# Patient Record
Sex: MALE | Race: WHITE | Employment: FULL TIME | URBAN - METROPOLITAN AREA
[De-identification: names, ages, dates, MRNs, and addresses within clinical notes are randomized per-mention and may not be internally consistent; named-entity substitution may affect disease eponyms.]

---

## 2019-01-02 ENCOUNTER — OFFICE VISIT (OUTPATIENT)
Dept: PULMONOLOGY | Age: 44
End: 2019-01-02
Payer: COMMERCIAL

## 2019-01-02 VITALS
OXYGEN SATURATION: 96 % | DIASTOLIC BLOOD PRESSURE: 80 MMHG | BODY MASS INDEX: 45.1 KG/M2 | HEART RATE: 64 BPM | SYSTOLIC BLOOD PRESSURE: 120 MMHG | HEIGHT: 70 IN | WEIGHT: 315 LBS

## 2019-01-02 DIAGNOSIS — G47.33 OSA (OBSTRUCTIVE SLEEP APNEA): ICD-10-CM

## 2019-01-02 DIAGNOSIS — R06.02 SOB (SHORTNESS OF BREATH) ON EXERTION: ICD-10-CM

## 2019-01-02 DIAGNOSIS — E66.01 MORBID OBESITY (HCC): ICD-10-CM

## 2019-01-02 DIAGNOSIS — G47.10 EXCESSIVE SLEEPINESS: ICD-10-CM

## 2019-01-02 PROCEDURE — 99204 OFFICE O/P NEW MOD 45 MIN: CPT | Performed by: INTERNAL MEDICINE

## 2019-01-02 RX ORDER — DEXLANSOPRAZOLE 60 MG/1
60 CAPSULE, DELAYED RELEASE ORAL DAILY
COMMUNITY
End: 2021-06-03

## 2019-01-02 RX ORDER — METOPROLOL TARTRATE 50 MG/1
50 TABLET, FILM COATED ORAL 2 TIMES DAILY
COMMUNITY

## 2019-01-02 ASSESSMENT — ENCOUNTER SYMPTOMS
EYE DISCHARGE: 0
BACK PAIN: 0
ABDOMINAL DISTENTION: 0
ABDOMINAL PAIN: 0
SHORTNESS OF BREATH: 0
EYE ITCHING: 0
COUGH: 0

## 2019-01-02 ASSESSMENT — SLEEP AND FATIGUE QUESTIONNAIRES
HOW LIKELY ARE YOU TO NOD OFF OR FALL ASLEEP WHILE SITTING AND READING: 2
HOW LIKELY ARE YOU TO NOD OFF OR FALL ASLEEP IN A CAR, WHILE STOPPED FOR A FEW MINUTES IN TRAFFIC: 0
ESS TOTAL SCORE: 7
HOW LIKELY ARE YOU TO NOD OFF OR FALL ASLEEP WHILE LYING DOWN TO REST IN THE AFTERNOON WHEN CIRCUMSTANCES PERMIT: 1
HOW LIKELY ARE YOU TO NOD OFF OR FALL ASLEEP WHILE SITTING AND TALKING TO SOMEONE: 0
HOW LIKELY ARE YOU TO NOD OFF OR FALL ASLEEP WHEN YOU ARE A PASSENGER IN A CAR FOR AN HOUR WITHOUT A BREAK: 1
HOW LIKELY ARE YOU TO NOD OFF OR FALL ASLEEP WHILE SITTING QUIETLY AFTER LUNCH WITHOUT ALCOHOL: 0
NECK CIRCUMFERENCE (INCHES): 19
HOW LIKELY ARE YOU TO NOD OFF OR FALL ASLEEP WHILE WATCHING TV: 2
HOW LIKELY ARE YOU TO NOD OFF OR FALL ASLEEP WHILE SITTING INACTIVE IN A PUBLIC PLACE: 1

## 2019-01-29 ENCOUNTER — HOSPITAL ENCOUNTER (OUTPATIENT)
Dept: SLEEP CENTER | Age: 44
Discharge: HOME | End: 2019-01-29
Payer: COMMERCIAL

## 2019-01-29 DIAGNOSIS — G47.33 OSA (OBSTRUCTIVE SLEEP APNEA): ICD-10-CM

## 2019-01-29 PROCEDURE — 95811 POLYSOM 6/>YRS CPAP 4/> PARM: CPT

## 2019-01-29 ASSESSMENT — SLEEP AND FATIGUE QUESTIONNAIRES
HOW LIKELY ARE YOU TO NOD OFF OR FALL ASLEEP WHEN YOU ARE A PASSENGER IN A CAR FOR AN HOUR WITHOUT A BREAK: 0
HOW LIKELY ARE YOU TO NOD OFF OR FALL ASLEEP WHILE SITTING QUIETLY AFTER LUNCH WITHOUT ALCOHOL: 0
HOW LIKELY ARE YOU TO NOD OFF OR FALL ASLEEP WHILE LYING DOWN TO REST IN THE AFTERNOON WHEN CIRCUMSTANCES PERMIT: 1
HOW LIKELY ARE YOU TO NOD OFF OR FALL ASLEEP IN A CAR, WHILE STOPPED FOR A FEW MINUTES IN TRAFFIC: 0
ESS TOTAL SCORE: 3
HOW LIKELY ARE YOU TO NOD OFF OR FALL ASLEEP WHILE SITTING INACTIVE IN A PUBLIC PLACE: 0
HOW LIKELY ARE YOU TO NOD OFF OR FALL ASLEEP WHILE SITTING AND READING: 1
HOW LIKELY ARE YOU TO NOD OFF OR FALL ASLEEP WHILE WATCHING TV: 1
HOW LIKELY ARE YOU TO NOD OFF OR FALL ASLEEP WHILE SITTING AND TALKING TO SOMEONE: 0

## 2019-01-30 PROCEDURE — 95811 POLYSOM 6/>YRS CPAP 4/> PARM: CPT | Performed by: INTERNAL MEDICINE

## 2019-02-11 ENCOUNTER — HOSPITAL ENCOUNTER (OUTPATIENT)
Dept: PULMONOLOGY | Age: 44
Discharge: HOME | End: 2019-02-11
Payer: COMMERCIAL

## 2019-02-11 DIAGNOSIS — R06.02 SOB (SHORTNESS OF BREATH) ON EXERTION: ICD-10-CM

## 2019-02-11 LAB
DLCO %PRED: 111 %
DLCO PRED: NORMAL ML/MIN/MMHG
DLCO/VA %PRED: NORMAL %
DLCO/VA PRED: NORMAL ML/MIN/MMHG
DLCO/VA: NORMAL ML/MIN/MMHG
DLCO: NORMAL ML/MIN/MMHG
EXPIRATORY TIME: NORMAL SEC
FEF 25-75% %PRED-PRE: NORMAL L/SEC
FEF 25-75% PRED: NORMAL L/SEC
FEF 25-75%-PRE: NORMAL L/SEC
FEV1 %PRED-PRE: 72 %
FEV1 PRED: NORMAL L
FEV1/FVC %PRED-PRE: NORMAL %
FEV1/FVC PRED: NORMAL %
FEV1/FVC: 80 %
FEV1: NORMAL L
FVC %PRED-PRE: NORMAL %
FVC PRED: NORMAL L
FVC: NORMAL L
GAW %PRED: NORMAL %
GAW PRED: NORMAL L/S/CMH2O
GAW: NORMAL L/S/CMH2O
IC %PRED: NORMAL %
IC PRED: NORMAL L
IC: NORMAL L
MVV %PRED-PRE: NORMAL %
MVV PRED: NORMAL L/MIN
MVV-PRE: NORMAL L/MIN
PEF %PRED-PRE: NORMAL L/SEC
PEF PRED: NORMAL L/SEC
PEF-PRE: NORMAL L/SEC
RAW %PRED: NORMAL %
RAW PRED: NORMAL CMH2O/L/S
RAW: NORMAL CMH2O/L/S
RV %PRED: NORMAL %
RV PRED: NORMAL L
RV: NORMAL L
SVC %PRED: NORMAL %
SVC PRED: NORMAL L
SVC: NORMAL L
TLC %PRED: 80 %
TLC PRED: NORMAL L
TLC: NORMAL L
VA %PRED: NORMAL %
VA PRED: NORMAL L
VA: NORMAL L
VTG %PRED: NORMAL %
VTG PRED: NORMAL L
VTG: NORMAL L

## 2019-02-11 PROCEDURE — 94726 PLETHYSMOGRAPHY LUNG VOLUMES: CPT

## 2019-02-11 PROCEDURE — 94729 DIFFUSING CAPACITY: CPT

## 2019-02-11 PROCEDURE — 94060 EVALUATION OF WHEEZING: CPT

## 2019-02-11 ASSESSMENT — PULMONARY FUNCTION TESTS
FEV1/FVC: 80
FEV1_PERCENT_PREDICTED_PRE: 72

## 2019-02-22 ENCOUNTER — OFFICE VISIT (OUTPATIENT)
Dept: PULMONOLOGY | Age: 44
End: 2019-02-22
Payer: COMMERCIAL

## 2019-02-22 VITALS
WEIGHT: 315 LBS | DIASTOLIC BLOOD PRESSURE: 78 MMHG | BODY MASS INDEX: 45.1 KG/M2 | OXYGEN SATURATION: 97 % | HEART RATE: 62 BPM | HEIGHT: 70 IN | SYSTOLIC BLOOD PRESSURE: 120 MMHG

## 2019-02-22 DIAGNOSIS — E66.01 MORBID OBESITY (HCC): ICD-10-CM

## 2019-02-22 DIAGNOSIS — G47.10 EXCESSIVE SLEEPINESS: ICD-10-CM

## 2019-02-22 DIAGNOSIS — G47.33 OSA (OBSTRUCTIVE SLEEP APNEA): ICD-10-CM

## 2019-02-22 DIAGNOSIS — R06.02 SOB (SHORTNESS OF BREATH) ON EXERTION: ICD-10-CM

## 2019-02-22 PROCEDURE — 99214 OFFICE O/P EST MOD 30 MIN: CPT | Performed by: INTERNAL MEDICINE

## 2019-02-22 ASSESSMENT — ENCOUNTER SYMPTOMS
ABDOMINAL DISTENTION: 0
BACK PAIN: 0
EYE DISCHARGE: 0
SHORTNESS OF BREATH: 0
ABDOMINAL PAIN: 0
COUGH: 0
EYE ITCHING: 0

## 2019-03-02 ENCOUNTER — HOSPITAL ENCOUNTER (EMERGENCY)
Age: 44
Discharge: HOME | End: 2019-03-03
Attending: EMERGENCY MEDICINE
Payer: COMMERCIAL

## 2019-03-02 DIAGNOSIS — N20.0 NEPHROLITHIASIS: ICD-10-CM

## 2019-03-02 DIAGNOSIS — N20.0 KIDNEY STONE: Primary | ICD-10-CM

## 2019-03-02 PROCEDURE — 83690 ASSAY OF LIPASE: CPT

## 2019-03-02 PROCEDURE — 80053 COMPREHEN METABOLIC PANEL: CPT

## 2019-03-02 PROCEDURE — 81001 URINALYSIS AUTO W/SCOPE: CPT

## 2019-03-02 PROCEDURE — 99284 EMERGENCY DEPT VISIT MOD MDM: CPT

## 2019-03-02 PROCEDURE — 85025 COMPLETE CBC W/AUTO DIFF WBC: CPT

## 2019-03-02 ASSESSMENT — PAIN DESCRIPTION - DESCRIPTORS: DESCRIPTORS: ACHING;SHARP

## 2019-03-02 ASSESSMENT — PAIN DESCRIPTION - ONSET: ONSET: ON-GOING

## 2019-03-02 ASSESSMENT — PAIN DESCRIPTION - LOCATION: LOCATION: FLANK

## 2019-03-02 ASSESSMENT — PAIN DESCRIPTION - ORIENTATION: ORIENTATION: RIGHT

## 2019-03-03 ENCOUNTER — APPOINTMENT (OUTPATIENT)
Dept: CT IMAGING | Age: 44
End: 2019-03-03
Payer: COMMERCIAL

## 2019-03-03 VITALS
HEIGHT: 70 IN | WEIGHT: 315 LBS | BODY MASS INDEX: 45.1 KG/M2 | TEMPERATURE: 97.4 F | OXYGEN SATURATION: 98 % | DIASTOLIC BLOOD PRESSURE: 73 MMHG | HEART RATE: 61 BPM | SYSTOLIC BLOOD PRESSURE: 114 MMHG | RESPIRATION RATE: 18 BRPM

## 2019-03-03 LAB
ALBUMIN SERPL-MCNC: 4.3 GM/DL (ref 3.4–5)
ALP BLD-CCNC: 65 IU/L (ref 40–128)
ALT SERPL-CCNC: 53 U/L (ref 10–40)
ANION GAP SERPL CALCULATED.3IONS-SCNC: 11 MMOL/L (ref 4–16)
AST SERPL-CCNC: 33 IU/L (ref 15–37)
BACTERIA: NEGATIVE /HPF
BASOPHILS ABSOLUTE: 0 K/CU MM
BASOPHILS RELATIVE PERCENT: 0.3 % (ref 0–1)
BILIRUB SERPL-MCNC: 0.3 MG/DL (ref 0–1)
BILIRUBIN URINE: NEGATIVE MG/DL
BLOOD, URINE: ABNORMAL
BUN BLDV-MCNC: 15 MG/DL (ref 6–23)
CALCIUM SERPL-MCNC: 9.4 MG/DL (ref 8.3–10.6)
CHLORIDE BLD-SCNC: 100 MMOL/L (ref 99–110)
CLARITY: CLEAR
CO2: 24 MMOL/L (ref 21–32)
COLOR: YELLOW
CREAT SERPL-MCNC: 1.8 MG/DL (ref 0.9–1.3)
DIFFERENTIAL TYPE: ABNORMAL
EOSINOPHILS ABSOLUTE: 0 K/CU MM
EOSINOPHILS RELATIVE PERCENT: 0 % (ref 0–3)
GFR AFRICAN AMERICAN: 50 ML/MIN/1.73M2
GFR NON-AFRICAN AMERICAN: 42 ML/MIN/1.73M2
GLUCOSE BLD-MCNC: 121 MG/DL (ref 70–99)
GLUCOSE, URINE: NEGATIVE MG/DL
HCT VFR BLD CALC: 44.7 % (ref 42–52)
HEMOGLOBIN: 14.5 GM/DL (ref 13.5–18)
HYALINE CASTS: 0 /LPF
IMMATURE NEUTROPHIL %: 0.8 % (ref 0–0.43)
KETONES, URINE: NEGATIVE MG/DL
LEUKOCYTE ESTERASE, URINE: NEGATIVE
LIPASE: 18 IU/L (ref 13–60)
LYMPHOCYTES ABSOLUTE: 1 K/CU MM
LYMPHOCYTES RELATIVE PERCENT: 7.3 % (ref 24–44)
MCH RBC QN AUTO: 30.3 PG (ref 27–31)
MCHC RBC AUTO-ENTMCNC: 32.4 % (ref 32–36)
MCV RBC AUTO: 93.5 FL (ref 78–100)
MONOCYTES ABSOLUTE: 0.6 K/CU MM
MONOCYTES RELATIVE PERCENT: 4.2 % (ref 0–4)
MUCUS: ABNORMAL HPF
NITRITE URINE, QUANTITATIVE: NEGATIVE
NUCLEATED RBC %: 0 %
PDW BLD-RTO: 12.3 % (ref 11.7–14.9)
PH, URINE: 5 (ref 5–8)
PLATELET # BLD: 215 K/CU MM (ref 140–440)
PMV BLD AUTO: 10.6 FL (ref 7.5–11.1)
POTASSIUM SERPL-SCNC: 4.4 MMOL/L (ref 3.5–5.1)
PROTEIN UA: NEGATIVE MG/DL
RBC # BLD: 4.78 M/CU MM (ref 4.6–6.2)
RBC URINE: 40 /HPF (ref 0–3)
SEGMENTED NEUTROPHILS ABSOLUTE COUNT: 11.6 K/CU MM
SEGMENTED NEUTROPHILS RELATIVE PERCENT: 87.4 % (ref 36–66)
SODIUM BLD-SCNC: 135 MMOL/L (ref 135–145)
SPECIFIC GRAVITY UA: 1.02 (ref 1–1.03)
SQUAMOUS EPITHELIAL: <1 /HPF
TOTAL IMMATURE NEUTOROPHIL: 0.11 K/CU MM
TOTAL NUCLEATED RBC: 0 K/CU MM
TOTAL PROTEIN: 7.1 GM/DL (ref 6.4–8.2)
TRICHOMONAS: ABNORMAL /HPF
UROBILINOGEN, URINE: NORMAL MG/DL (ref 0.2–1)
WBC # BLD: 13.3 K/CU MM (ref 4–10.5)
WBC UA: 1 /HPF (ref 0–2)

## 2019-03-03 PROCEDURE — 6360000002 HC RX W HCPCS: Performed by: EMERGENCY MEDICINE

## 2019-03-03 PROCEDURE — 2580000003 HC RX 258: Performed by: EMERGENCY MEDICINE

## 2019-03-03 PROCEDURE — 6370000000 HC RX 637 (ALT 250 FOR IP): Performed by: EMERGENCY MEDICINE

## 2019-03-03 PROCEDURE — 74176 CT ABD & PELVIS W/O CONTRAST: CPT

## 2019-03-03 PROCEDURE — 96374 THER/PROPH/DIAG INJ IV PUSH: CPT

## 2019-03-03 PROCEDURE — 87040 BLOOD CULTURE FOR BACTERIA: CPT

## 2019-03-03 PROCEDURE — 96375 TX/PRO/DX INJ NEW DRUG ADDON: CPT

## 2019-03-03 RX ORDER — 0.9 % SODIUM CHLORIDE 0.9 %
1000 INTRAVENOUS SOLUTION INTRAVENOUS ONCE
Status: COMPLETED | OUTPATIENT
Start: 2019-03-03 | End: 2019-03-03

## 2019-03-03 RX ORDER — OXYCODONE HYDROCHLORIDE AND ACETAMINOPHEN 5; 325 MG/1; MG/1
1 TABLET ORAL ONCE
Status: COMPLETED | OUTPATIENT
Start: 2019-03-03 | End: 2019-03-03

## 2019-03-03 RX ORDER — TAMSULOSIN HYDROCHLORIDE 0.4 MG/1
0.4 CAPSULE ORAL DAILY
Qty: 5 CAPSULE | Refills: 0 | Status: SHIPPED | OUTPATIENT
Start: 2019-03-03 | End: 2021-05-12

## 2019-03-03 RX ORDER — ONDANSETRON 2 MG/ML
4 INJECTION INTRAMUSCULAR; INTRAVENOUS ONCE
Status: COMPLETED | OUTPATIENT
Start: 2019-03-03 | End: 2019-03-03

## 2019-03-03 RX ORDER — MORPHINE SULFATE 4 MG/ML
4 INJECTION, SOLUTION INTRAMUSCULAR; INTRAVENOUS ONCE
Status: COMPLETED | OUTPATIENT
Start: 2019-03-03 | End: 2019-03-03

## 2019-03-03 RX ORDER — KETOROLAC TROMETHAMINE 30 MG/ML
15 INJECTION, SOLUTION INTRAMUSCULAR; INTRAVENOUS ONCE
Status: COMPLETED | OUTPATIENT
Start: 2019-03-03 | End: 2019-03-03

## 2019-03-03 RX ORDER — OXYCODONE HYDROCHLORIDE AND ACETAMINOPHEN 5; 325 MG/1; MG/1
1 TABLET ORAL EVERY 8 HOURS PRN
Qty: 15 TABLET | Refills: 0 | Status: SHIPPED | OUTPATIENT
Start: 2019-03-03 | End: 2019-03-10

## 2019-03-03 RX ORDER — ONDANSETRON 4 MG/1
4 TABLET, FILM COATED ORAL EVERY 8 HOURS PRN
Qty: 30 TABLET | Refills: 0 | Status: SHIPPED | OUTPATIENT
Start: 2019-03-03 | End: 2021-05-12

## 2019-03-03 RX ADMIN — ONDANSETRON 4 MG: 2 INJECTION INTRAMUSCULAR; INTRAVENOUS at 00:27

## 2019-03-03 RX ADMIN — KETOROLAC TROMETHAMINE 15 MG: 30 INJECTION, SOLUTION INTRAMUSCULAR at 00:26

## 2019-03-03 RX ADMIN — SODIUM CHLORIDE 1000 ML: 9 INJECTION, SOLUTION INTRAVENOUS at 01:20

## 2019-03-03 RX ADMIN — MORPHINE SULFATE 4 MG: 4 INJECTION INTRAVENOUS at 00:27

## 2019-03-03 RX ADMIN — OXYCODONE HYDROCHLORIDE AND ACETAMINOPHEN 1 TABLET: 5; 325 TABLET ORAL at 04:05

## 2019-03-03 ASSESSMENT — PAIN SCALES - GENERAL
PAINLEVEL_OUTOF10: 6
PAINLEVEL_OUTOF10: 1
PAINLEVEL_OUTOF10: 3
PAINLEVEL_OUTOF10: 6

## 2019-03-03 ASSESSMENT — PAIN DESCRIPTION - PAIN TYPE: TYPE: ACUTE PAIN

## 2019-03-03 ASSESSMENT — PAIN DESCRIPTION - ORIENTATION: ORIENTATION: RIGHT

## 2019-03-03 ASSESSMENT — PAIN DESCRIPTION - DESCRIPTORS: DESCRIPTORS: SHARP

## 2019-03-03 ASSESSMENT — PAIN DESCRIPTION - LOCATION: LOCATION: FLANK

## 2019-03-04 ENCOUNTER — HOSPITAL ENCOUNTER (OUTPATIENT)
Age: 44
Setting detail: OUTPATIENT SURGERY
Discharge: HOME | End: 2019-03-04
Attending: UROLOGY | Admitting: UROLOGY
Payer: COMMERCIAL

## 2019-03-04 ENCOUNTER — ANESTHESIA EVENT (OUTPATIENT)
Dept: OPERATING ROOM | Age: 44
End: 2019-03-04
Payer: COMMERCIAL

## 2019-03-04 ENCOUNTER — APPOINTMENT (OUTPATIENT)
Dept: GENERAL RADIOLOGY | Age: 44
End: 2019-03-04
Attending: UROLOGY
Payer: COMMERCIAL

## 2019-03-04 ENCOUNTER — ANESTHESIA (OUTPATIENT)
Dept: OPERATING ROOM | Age: 44
End: 2019-03-04
Payer: COMMERCIAL

## 2019-03-04 VITALS
TEMPERATURE: 97.7 F | OXYGEN SATURATION: 99 % | SYSTOLIC BLOOD PRESSURE: 114 MMHG | DIASTOLIC BLOOD PRESSURE: 59 MMHG | RESPIRATION RATE: 3 BRPM

## 2019-03-04 VITALS
DIASTOLIC BLOOD PRESSURE: 60 MMHG | HEART RATE: 67 BPM | HEIGHT: 70 IN | RESPIRATION RATE: 16 BRPM | WEIGHT: 315 LBS | OXYGEN SATURATION: 95 % | BODY MASS INDEX: 45.1 KG/M2 | TEMPERATURE: 97 F | SYSTOLIC BLOOD PRESSURE: 134 MMHG

## 2019-03-04 DIAGNOSIS — N20.1 URETERAL STONE: Primary | ICD-10-CM

## 2019-03-04 PROCEDURE — 7100000000 HC PACU RECOVERY - FIRST 15 MIN: Performed by: UROLOGY

## 2019-03-04 PROCEDURE — 2580000003 HC RX 258

## 2019-03-04 PROCEDURE — 7100000011 HC PHASE II RECOVERY - ADDTL 15 MIN: Performed by: UROLOGY

## 2019-03-04 PROCEDURE — C1769 GUIDE WIRE: HCPCS | Performed by: UROLOGY

## 2019-03-04 PROCEDURE — 3600000003 HC SURGERY LEVEL 3 BASE: Performed by: UROLOGY

## 2019-03-04 PROCEDURE — 2709999900 HC NON-CHARGEABLE SUPPLY: Performed by: UROLOGY

## 2019-03-04 PROCEDURE — 7100000010 HC PHASE II RECOVERY - FIRST 15 MIN: Performed by: UROLOGY

## 2019-03-04 PROCEDURE — C1758 CATHETER, URETERAL: HCPCS | Performed by: UROLOGY

## 2019-03-04 PROCEDURE — 6360000002 HC RX W HCPCS: Performed by: UROLOGY

## 2019-03-04 PROCEDURE — 6360000002 HC RX W HCPCS: Performed by: NURSE ANESTHETIST, CERTIFIED REGISTERED

## 2019-03-04 PROCEDURE — 7100000001 HC PACU RECOVERY - ADDTL 15 MIN: Performed by: UROLOGY

## 2019-03-04 PROCEDURE — 76000 FLUOROSCOPY <1 HR PHYS/QHP: CPT

## 2019-03-04 PROCEDURE — 6360000004 HC RX CONTRAST MEDICATION: Performed by: UROLOGY

## 2019-03-04 PROCEDURE — C2617 STENT, NON-COR, TEM W/O DEL: HCPCS | Performed by: UROLOGY

## 2019-03-04 PROCEDURE — 3700000001 HC ADD 15 MINUTES (ANESTHESIA): Performed by: UROLOGY

## 2019-03-04 PROCEDURE — 3600000013 HC SURGERY LEVEL 3 ADDTL 15MIN: Performed by: UROLOGY

## 2019-03-04 PROCEDURE — C1773 RET DEV, INSERTABLE: HCPCS | Performed by: UROLOGY

## 2019-03-04 PROCEDURE — 3700000000 HC ANESTHESIA ATTENDED CARE: Performed by: UROLOGY

## 2019-03-04 PROCEDURE — C1894 INTRO/SHEATH, NON-LASER: HCPCS | Performed by: UROLOGY

## 2019-03-04 DEVICE — VARIABLE LENGTH URETERAL STENT
Type: IMPLANTABLE DEVICE | Site: URETER | Status: FUNCTIONAL
Brand: CONTOUR VL™

## 2019-03-04 RX ORDER — DEXAMETHASONE SODIUM PHOSPHATE 4 MG/ML
INJECTION, SOLUTION INTRA-ARTICULAR; INTRALESIONAL; INTRAMUSCULAR; INTRAVENOUS; SOFT TISSUE PRN
Status: DISCONTINUED | OUTPATIENT
Start: 2019-03-04 | End: 2019-03-04 | Stop reason: SDUPTHER

## 2019-03-04 RX ORDER — FENTANYL CITRATE 50 UG/ML
50 INJECTION, SOLUTION INTRAMUSCULAR; INTRAVENOUS EVERY 5 MIN PRN
Status: CANCELLED | OUTPATIENT
Start: 2019-03-04

## 2019-03-04 RX ORDER — CEFAZOLIN SODIUM 2 G/100ML
2 INJECTION, SOLUTION INTRAVENOUS
Status: DISCONTINUED | OUTPATIENT
Start: 2019-03-04 | End: 2019-03-04 | Stop reason: ALTCHOICE

## 2019-03-04 RX ORDER — DIPHENHYDRAMINE HYDROCHLORIDE 50 MG/ML
12.5 INJECTION INTRAMUSCULAR; INTRAVENOUS
Status: CANCELLED | OUTPATIENT
Start: 2019-03-04 | End: 2019-03-04

## 2019-03-04 RX ORDER — SODIUM CHLORIDE, SODIUM LACTATE, POTASSIUM CHLORIDE, CALCIUM CHLORIDE 600; 310; 30; 20 MG/100ML; MG/100ML; MG/100ML; MG/100ML
INJECTION, SOLUTION INTRAVENOUS
Status: COMPLETED
Start: 2019-03-04 | End: 2019-03-04

## 2019-03-04 RX ORDER — PROPOFOL 10 MG/ML
INJECTION, EMULSION INTRAVENOUS PRN
Status: DISCONTINUED | OUTPATIENT
Start: 2019-03-04 | End: 2019-03-04 | Stop reason: SDUPTHER

## 2019-03-04 RX ORDER — HYDRALAZINE HYDROCHLORIDE 20 MG/ML
5 INJECTION INTRAMUSCULAR; INTRAVENOUS EVERY 10 MIN PRN
Status: CANCELLED | OUTPATIENT
Start: 2019-03-04

## 2019-03-04 RX ORDER — HYDROMORPHONE HCL 110MG/55ML
0.5 PATIENT CONTROLLED ANALGESIA SYRINGE INTRAVENOUS EVERY 5 MIN PRN
Status: CANCELLED | OUTPATIENT
Start: 2019-03-04

## 2019-03-04 RX ORDER — FENTANYL CITRATE 50 UG/ML
INJECTION, SOLUTION INTRAMUSCULAR; INTRAVENOUS PRN
Status: DISCONTINUED | OUTPATIENT
Start: 2019-03-04 | End: 2019-03-04 | Stop reason: SDUPTHER

## 2019-03-04 RX ORDER — PROMETHAZINE HYDROCHLORIDE 25 MG/ML
6.25 INJECTION, SOLUTION INTRAMUSCULAR; INTRAVENOUS
Status: CANCELLED | OUTPATIENT
Start: 2019-03-04 | End: 2019-03-04

## 2019-03-04 RX ORDER — MEPERIDINE HYDROCHLORIDE 25 MG/ML
12.5 INJECTION INTRAMUSCULAR; INTRAVENOUS; SUBCUTANEOUS EVERY 5 MIN PRN
Status: CANCELLED | OUTPATIENT
Start: 2019-03-04

## 2019-03-04 RX ORDER — LABETALOL HYDROCHLORIDE 5 MG/ML
5 INJECTION, SOLUTION INTRAVENOUS EVERY 10 MIN PRN
Status: CANCELLED | OUTPATIENT
Start: 2019-03-04

## 2019-03-04 RX ORDER — MIDAZOLAM HYDROCHLORIDE 1 MG/ML
INJECTION INTRAMUSCULAR; INTRAVENOUS PRN
Status: DISCONTINUED | OUTPATIENT
Start: 2019-03-04 | End: 2019-03-04 | Stop reason: SDUPTHER

## 2019-03-04 RX ORDER — CIPROFLOXACIN 2 MG/ML
400 INJECTION, SOLUTION INTRAVENOUS
Status: COMPLETED | OUTPATIENT
Start: 2019-03-04 | End: 2019-03-04

## 2019-03-04 RX ORDER — SODIUM CHLORIDE, SODIUM LACTATE, POTASSIUM CHLORIDE, CALCIUM CHLORIDE 600; 310; 30; 20 MG/100ML; MG/100ML; MG/100ML; MG/100ML
INJECTION, SOLUTION INTRAVENOUS CONTINUOUS
Status: DISCONTINUED | OUTPATIENT
Start: 2019-03-04 | End: 2019-03-04 | Stop reason: HOSPADM

## 2019-03-04 RX ORDER — KETOROLAC TROMETHAMINE 30 MG/ML
INJECTION, SOLUTION INTRAMUSCULAR; INTRAVENOUS PRN
Status: DISCONTINUED | OUTPATIENT
Start: 2019-03-04 | End: 2019-03-04 | Stop reason: SDUPTHER

## 2019-03-04 RX ORDER — ONDANSETRON 2 MG/ML
INJECTION INTRAMUSCULAR; INTRAVENOUS PRN
Status: DISCONTINUED | OUTPATIENT
Start: 2019-03-04 | End: 2019-03-04 | Stop reason: SDUPTHER

## 2019-03-04 RX ORDER — OXYCODONE HYDROCHLORIDE AND ACETAMINOPHEN 5; 325 MG/1; MG/1
1 TABLET ORAL EVERY 6 HOURS PRN
Qty: 12 TABLET | Refills: 0 | Status: SHIPPED | OUTPATIENT
Start: 2019-03-04 | End: 2019-03-07

## 2019-03-04 RX ORDER — CEPHALEXIN 500 MG/1
500 CAPSULE ORAL 3 TIMES DAILY
Qty: 15 CAPSULE | Refills: 0 | Status: SHIPPED | OUTPATIENT
Start: 2019-03-04 | End: 2021-05-12 | Stop reason: ALTCHOICE

## 2019-03-04 RX ADMIN — DEXAMETHASONE SODIUM PHOSPHATE 8 MG: 4 INJECTION, SOLUTION INTRAMUSCULAR; INTRAVENOUS at 14:41

## 2019-03-04 RX ADMIN — MIDAZOLAM HYDROCHLORIDE 2 MG: 1 INJECTION, SOLUTION INTRAMUSCULAR; INTRAVENOUS at 14:37

## 2019-03-04 RX ADMIN — FENTANYL CITRATE 100 MCG: 50 INJECTION INTRAMUSCULAR; INTRAVENOUS at 14:40

## 2019-03-04 RX ADMIN — ONDANSETRON 4 MG: 2 INJECTION INTRAMUSCULAR; INTRAVENOUS at 14:41

## 2019-03-04 RX ADMIN — PROPOFOL 300 MG: 10 INJECTION, EMULSION INTRAVENOUS at 14:40

## 2019-03-04 RX ADMIN — SODIUM CHLORIDE, SODIUM LACTATE, POTASSIUM CHLORIDE, CALCIUM CHLORIDE: 600; 310; 30; 20 INJECTION, SOLUTION INTRAVENOUS at 13:52

## 2019-03-04 RX ADMIN — SODIUM CHLORIDE, POTASSIUM CHLORIDE, SODIUM LACTATE AND CALCIUM CHLORIDE: 600; 310; 30; 20 INJECTION, SOLUTION INTRAVENOUS at 13:52

## 2019-03-04 RX ADMIN — LIDOCAINE HYDROCHLORIDE 100 MG: 20 INJECTION, SOLUTION INTRAVENOUS at 14:40

## 2019-03-04 RX ADMIN — CIPROFLOXACIN 400 MG: 2 INJECTION, SOLUTION INTRAVENOUS at 14:46

## 2019-03-04 RX ADMIN — KETOROLAC TROMETHAMINE 30 MG: 30 INJECTION, SOLUTION INTRAMUSCULAR; INTRAVENOUS at 15:25

## 2019-03-04 RX ADMIN — FENTANYL CITRATE 50 MCG: 50 INJECTION INTRAMUSCULAR; INTRAVENOUS at 15:09

## 2019-03-04 RX ADMIN — FENTANYL CITRATE 50 MCG: 50 INJECTION INTRAMUSCULAR; INTRAVENOUS at 15:05

## 2019-03-04 ASSESSMENT — PULMONARY FUNCTION TESTS
PIF_VALUE: 12
PIF_VALUE: 13
PIF_VALUE: 14
PIF_VALUE: 15
PIF_VALUE: 12
PIF_VALUE: 5
PIF_VALUE: 5
PIF_VALUE: 14
PIF_VALUE: 5
PIF_VALUE: 13
PIF_VALUE: 13
PIF_VALUE: 11
PIF_VALUE: 2
PIF_VALUE: 11
PIF_VALUE: 4
PIF_VALUE: 13
PIF_VALUE: 13
PIF_VALUE: 16
PIF_VALUE: 1
PIF_VALUE: 1
PIF_VALUE: 17
PIF_VALUE: 20
PIF_VALUE: 13
PIF_VALUE: 1
PIF_VALUE: 8
PIF_VALUE: 13
PIF_VALUE: 12
PIF_VALUE: 1
PIF_VALUE: 20
PIF_VALUE: 1
PIF_VALUE: 19
PIF_VALUE: 11
PIF_VALUE: 14
PIF_VALUE: 13
PIF_VALUE: 12
PIF_VALUE: 18
PIF_VALUE: 14
PIF_VALUE: 13
PIF_VALUE: 1
PIF_VALUE: 14
PIF_VALUE: 11
PIF_VALUE: 17
PIF_VALUE: 12
PIF_VALUE: 20
PIF_VALUE: 20
PIF_VALUE: 21
PIF_VALUE: 13
PIF_VALUE: 14
PIF_VALUE: 0
PIF_VALUE: 13
PIF_VALUE: 13
PIF_VALUE: 14
PIF_VALUE: 12
PIF_VALUE: 2

## 2019-03-04 ASSESSMENT — ENCOUNTER SYMPTOMS: SHORTNESS OF BREATH: 1

## 2019-03-04 ASSESSMENT — PAIN SCALES - GENERAL
PAINLEVEL_OUTOF10: 0

## 2019-03-04 ASSESSMENT — COPD QUESTIONNAIRES: CAT_SEVERITY: MILD

## 2019-03-04 ASSESSMENT — PAIN - FUNCTIONAL ASSESSMENT: PAIN_FUNCTIONAL_ASSESSMENT: 0-10

## 2019-03-08 LAB
CULTURE: NORMAL
CULTURE: NORMAL
Lab: NORMAL
Lab: NORMAL
REPORT STATUS: NORMAL
REPORT STATUS: NORMAL
SPECIMEN: NORMAL
SPECIMEN: NORMAL

## 2019-03-11 ENCOUNTER — HOSPITAL ENCOUNTER (EMERGENCY)
Age: 44
Discharge: HOME | End: 2019-03-11
Attending: EMERGENCY MEDICINE
Payer: COMMERCIAL

## 2019-03-11 ENCOUNTER — APPOINTMENT (OUTPATIENT)
Dept: ULTRASOUND IMAGING | Age: 44
End: 2019-03-11
Payer: COMMERCIAL

## 2019-03-11 VITALS
RESPIRATION RATE: 16 BRPM | WEIGHT: 240 LBS | HEART RATE: 88 BPM | DIASTOLIC BLOOD PRESSURE: 55 MMHG | SYSTOLIC BLOOD PRESSURE: 103 MMHG | OXYGEN SATURATION: 100 % | TEMPERATURE: 97.3 F | HEIGHT: 70 IN | BODY MASS INDEX: 34.36 KG/M2

## 2019-03-11 DIAGNOSIS — Z96.0 STATUS POST PLACEMENT OF URETERAL STENT: Primary | ICD-10-CM

## 2019-03-11 LAB
ALBUMIN SERPL-MCNC: 4.2 GM/DL (ref 3.4–5)
ALP BLD-CCNC: 70 IU/L (ref 40–129)
ALT SERPL-CCNC: 44 U/L (ref 10–40)
ANION GAP SERPL CALCULATED.3IONS-SCNC: 10 MMOL/L (ref 4–16)
AST SERPL-CCNC: 28 IU/L (ref 15–37)
BACTERIA: NEGATIVE /HPF
BASOPHILS ABSOLUTE: 0.1 K/CU MM
BASOPHILS RELATIVE PERCENT: 0.6 % (ref 0–1)
BILIRUB SERPL-MCNC: 0.4 MG/DL (ref 0–1)
BILIRUBIN URINE: NEGATIVE MG/DL
BLOOD, URINE: ABNORMAL
BUN BLDV-MCNC: 18 MG/DL (ref 6–23)
CALCIUM SERPL-MCNC: 8.9 MG/DL (ref 8.3–10.6)
CHLORIDE BLD-SCNC: 103 MMOL/L (ref 99–110)
CLARITY: CLEAR
CO2: 24 MMOL/L (ref 21–32)
COLOR: YELLOW
CREAT SERPL-MCNC: 1.7 MG/DL (ref 0.9–1.3)
DIFFERENTIAL TYPE: ABNORMAL
EOSINOPHILS ABSOLUTE: 0.1 K/CU MM
EOSINOPHILS RELATIVE PERCENT: 1.1 % (ref 0–3)
GFR AFRICAN AMERICAN: 54 ML/MIN/1.73M2
GFR NON-AFRICAN AMERICAN: 44 ML/MIN/1.73M2
GLUCOSE BLD-MCNC: 99 MG/DL (ref 70–99)
GLUCOSE, URINE: NEGATIVE MG/DL
HCT VFR BLD CALC: 49.2 % (ref 42–52)
HEMOGLOBIN: 15.7 GM/DL (ref 13.5–18)
HYALINE CASTS: 0 /LPF
IMMATURE NEUTROPHIL %: 1.2 % (ref 0–0.43)
KETONES, URINE: NEGATIVE MG/DL
LEUKOCYTE ESTERASE, URINE: ABNORMAL
LYMPHOCYTES ABSOLUTE: 2.2 K/CU MM
LYMPHOCYTES RELATIVE PERCENT: 16.9 % (ref 24–44)
MCH RBC QN AUTO: 30 PG (ref 27–31)
MCHC RBC AUTO-ENTMCNC: 31.9 % (ref 32–36)
MCV RBC AUTO: 94.1 FL (ref 78–100)
MONOCYTES ABSOLUTE: 0.9 K/CU MM
MONOCYTES RELATIVE PERCENT: 6.7 % (ref 0–4)
MUCUS: ABNORMAL HPF
NITRITE URINE, QUANTITATIVE: NEGATIVE
NUCLEATED RBC %: 0 %
PDW BLD-RTO: 12.6 % (ref 11.7–14.9)
PH, URINE: 5 (ref 5–8)
PLATELET # BLD: 260 K/CU MM (ref 140–440)
PMV BLD AUTO: 10.2 FL (ref 7.5–11.1)
POTASSIUM SERPL-SCNC: 4.3 MMOL/L (ref 3.5–5.1)
PROTEIN UA: 30 MG/DL
RBC # BLD: 5.23 M/CU MM (ref 4.6–6.2)
RBC URINE: 8 /HPF (ref 0–3)
SEGMENTED NEUTROPHILS ABSOLUTE COUNT: 9.8 K/CU MM
SEGMENTED NEUTROPHILS RELATIVE PERCENT: 73.5 % (ref 36–66)
SODIUM BLD-SCNC: 137 MMOL/L (ref 135–145)
SPECIFIC GRAVITY UA: 1.01 (ref 1–1.03)
TOTAL IMMATURE NEUTOROPHIL: 0.16 K/CU MM
TOTAL NUCLEATED RBC: 0 K/CU MM
TOTAL PROTEIN: 7.7 GM/DL (ref 6.4–8.2)
TRICHOMONAS: ABNORMAL /HPF
UROBILINOGEN, URINE: NORMAL MG/DL (ref 0.2–1)
WBC # BLD: 13.3 K/CU MM (ref 4–10.5)
WBC UA: 9 /HPF (ref 0–2)

## 2019-03-11 PROCEDURE — 80053 COMPREHEN METABOLIC PANEL: CPT

## 2019-03-11 PROCEDURE — 99284 EMERGENCY DEPT VISIT MOD MDM: CPT

## 2019-03-11 PROCEDURE — 81001 URINALYSIS AUTO W/SCOPE: CPT

## 2019-03-11 PROCEDURE — 85025 COMPLETE CBC W/AUTO DIFF WBC: CPT

## 2019-03-11 PROCEDURE — 76775 US EXAM ABDO BACK WALL LIM: CPT

## 2019-03-11 PROCEDURE — 36415 COLL VENOUS BLD VENIPUNCTURE: CPT

## 2019-03-11 PROCEDURE — 6370000000 HC RX 637 (ALT 250 FOR IP): Performed by: EMERGENCY MEDICINE

## 2019-03-11 RX ORDER — ACETAMINOPHEN 325 MG/1
650 TABLET ORAL ONCE
Status: COMPLETED | OUTPATIENT
Start: 2019-03-11 | End: 2019-03-11

## 2019-03-11 RX ORDER — OXYCODONE HYDROCHLORIDE AND ACETAMINOPHEN 5; 325 MG/1; MG/1
1 TABLET ORAL ONCE
Status: COMPLETED | OUTPATIENT
Start: 2019-03-11 | End: 2019-03-11

## 2019-03-11 RX ADMIN — OXYCODONE AND ACETAMINOPHEN 1 TABLET: 5; 325 TABLET ORAL at 22:07

## 2019-03-11 RX ADMIN — ACETAMINOPHEN 650 MG: 325 TABLET ORAL at 22:07

## 2019-03-11 ASSESSMENT — PAIN DESCRIPTION - ORIENTATION: ORIENTATION: RIGHT

## 2019-03-11 ASSESSMENT — PAIN DESCRIPTION - LOCATION: LOCATION: FLANK

## 2019-03-11 ASSESSMENT — PAIN SCALES - GENERAL
PAINLEVEL_OUTOF10: 6
PAINLEVEL_OUTOF10: 5

## 2019-03-11 ASSESSMENT — PAIN DESCRIPTION - PAIN TYPE: TYPE: ACUTE PAIN

## 2019-04-26 ENCOUNTER — OFFICE VISIT (OUTPATIENT)
Dept: PULMONOLOGY | Age: 44
End: 2019-04-26
Payer: COMMERCIAL

## 2019-04-26 VITALS
HEART RATE: 65 BPM | HEIGHT: 70 IN | DIASTOLIC BLOOD PRESSURE: 76 MMHG | SYSTOLIC BLOOD PRESSURE: 122 MMHG | WEIGHT: 243.3 LBS | OXYGEN SATURATION: 98 % | BODY MASS INDEX: 34.83 KG/M2

## 2019-04-26 DIAGNOSIS — E66.01 MORBID OBESITY (HCC): ICD-10-CM

## 2019-04-26 DIAGNOSIS — R06.02 SOB (SHORTNESS OF BREATH) ON EXERTION: ICD-10-CM

## 2019-04-26 DIAGNOSIS — G47.33 OSA (OBSTRUCTIVE SLEEP APNEA): ICD-10-CM

## 2019-04-26 DIAGNOSIS — G47.10 EXCESSIVE SLEEPINESS: ICD-10-CM

## 2019-04-26 PROCEDURE — 99214 OFFICE O/P EST MOD 30 MIN: CPT | Performed by: INTERNAL MEDICINE

## 2019-04-26 ASSESSMENT — ENCOUNTER SYMPTOMS
EYE ITCHING: 0
ABDOMINAL DISTENTION: 0
ABDOMINAL PAIN: 0
COUGH: 0
SHORTNESS OF BREATH: 0
BACK PAIN: 0
EYE DISCHARGE: 0

## 2019-04-26 NOTE — PROGRESS NOTES
Awilda Menchaca  1975  Referring Provider: Debora Cool    Subjective:     Chief Complaint   Patient presents with    Sleep Apnea       HPI  Ava Herrera is a 44 y.o. male has come back as a follow up. He has  Been diagnosed with SOFIA requiring BIPAP of 19/15. He has been using every night about 5 marleni 9 hours. He says that it is helping him. He has a FFM. He has lost about 6 lbs since his last visit. He is not sleepy during the day time. His 2 week download data shows that his residual AHI is 0.7 and no leak    Current Outpatient Medications   Medication Sig Dispense Refill    metoprolol tartrate (LOPRESSOR) 50 MG tablet Take 50 mg by mouth 2 times daily      amLODIPine-olmesartan (FINESSE) 5-20 MG per tablet Take 1 tablet by mouth daily      cephALEXin (KEFLEX) 500 MG capsule Take 1 capsule by mouth 3 times daily 15 capsule 0    tamsulosin (FLOMAX) 0.4 MG capsule Take 1 capsule by mouth daily for 5 doses 5 capsule 0    ondansetron (ZOFRAN) 4 MG tablet Take 1 tablet by mouth every 8 hours as needed for Nausea or Vomiting 30 tablet 0    dexlansoprazole (DEXILANT) 60 MG CPDR delayed release capsule Take 60 mg by mouth daily      docusate sodium (COLACE) 100 MG capsule Take 1 capsule by mouth daily as needed for Constipation (while taking pain medicine) 30 capsule 3    PROVENTIL  (90 BASE) MCG/ACT inhaler Inhale 2 puffs into the lungs every 6 hours as needed for Wheezing. 1 Inhaler 3    nebivolol (BYSTOLIC) 10 MG tablet Take 10 mg by mouth nightly. No current facility-administered medications for this visit.         No Known Allergies    Past Medical History:   Diagnosis Date    COPD (chronic obstructive pulmonary disease) ([facility])     see Dr Fernando Granger GERD (gastroesophageal reflux disease)     before gallbladder removed now it's ok    Hypertension     \"had since 2005- see Dr Joaquin Elena\"they did a stress and echo after I came with chest pain 7/2014- not my heart the tests all clear- it was the gallbladder\"    Kidney stone     \"have a kidney stone now- have passed 2 or 3 in the past\"    PONV (postoperative nausea and vomiting)     PONV    Short of breath on exertion     Sleep apnea     has sleep study 2014- use a c-pap machine for the past 8 weeks    Vertigo     \"noticed since I have been on the Topomax for weight loss\" No longer on topamax. Past Surgical History:   Procedure Laterality Date    CARPAL TUNNEL RELEASE Right 2012    CHOLECYSTECTOMY  9/9/14    CYSTOSCOPY Right 3/4/2019    CYSTOSCOPY RETROGRADE PYLEOGRAMS STONE MANIPULATION, with stent insertion performed by Analisa Wesley MD at [address] Right 2001    \"Swollen lymph node\"    HAND SURGERY Right 1999    Thumb reconstruction    HERNIA REPAIR Right last one 2013    inguinal x 5    TONSILLECTOMY AND ADENOIDECTOMY   age of 9 yrs    UMBILICAL HERNIA REPAIR  2/9/09    UMBILICAL HERNIA REPAIR  10/06/15    laparoscopic       Social History     Socioeconomic History    Marital status:      Spouse name: None    Number of children: None    Years of education: None    Highest education level: None   Occupational History    None   Social Needs    Financial resource strain: None    Food insecurity:     Worry: None     Inability: None    Transportation needs:     Medical: None     Non-medical: None   Tobacco Use    Smoking status: Never Smoker    Smokeless tobacco: Never Used   Substance and Sexual Activity    Alcohol use: No     Comment:         CAFFEINE: 48 oz [employer] daily.     Drug use: No    Sexual activity: None   Lifestyle    Physical activity:     Days per week: None     Minutes per session: None    Stress: None   Relationships    Social connections:     Talks on phone: None     Gets together: None     Attends Muslim service: None     Active member of club or organization: None     Attends meetings of clubs or organizations: None     Relationship status: None    Intimate partner violence:     Fear of current or ex partner: None     Emotionally abused: None     Physically abused: None     Forced sexual activity: None   Other Topics Concern    None   Social History Narrative    None       Review of Systems   Constitutional: Negative for fatigue. HENT: Negative for congestion and postnasal drip. Eyes: Negative for discharge and itching. Respiratory: Negative for cough and shortness of breath. Cardiovascular: Negative for chest pain and leg swelling. Gastrointestinal: Negative for abdominal distention and abdominal pain. Endocrine: Negative for cold intolerance and heat intolerance. Genitourinary: Negative for enuresis and frequency. Musculoskeletal: Negative for arthralgias and back pain. Allergic/Immunologic: Negative for food allergies. Neurological: Negative for light-headedness and headaches. Hematological: Negative for adenopathy. Psychiatric/Behavioral: Negative for agitation and behavioral problems. Objective:   /76   Pulse 65   Ht 5' 10\" (1.778 m)   Wt 243 lb 4.8 oz (110.4 kg)   SpO2 98%   BMI 34.91 kg/m²   Body mass index is 34.91 kg/m². Sleep Medicine 1/29/2019 1/2/2019   Sitting and reading 1 2   Watching TV 1 2   Sitting, inactive in a public place (e.g. a theatre or a meeting) 0 1   As a passenger in a car for an hour without a break 0 1   Lying down to rest in the afternoon when circumstances permit 1 1   Sitting and talking to someone 0 0   Sitting quietly after a lunch without alcohol 0 0   In a car, while stopped for a few minutes in traffic 0 0   Total score 3 7   Neck circumference - 19     {MALLAMPATI:3    Physical Exam   Constitutional: He is oriented to person, place, and time. He appears well-developed and well-nourished. Obesity   HENT:   Head: Normocephalic and atraumatic. Eyes: Pupils are equal, round, and reactive to light. EOM are normal.   Neck: Normal range of motion. Neck supple.    Cardiovascular: Normal rate, regular rhythm and normal heart sounds. Pulmonary/Chest: Effort normal and breath sounds normal.   Abdominal: Soft. Bowel sounds are normal.   Musculoskeletal: Normal range of motion. Neurological: He is alert and oriented to person, place, and time. Skin: Skin is warm and dry. Psychiatric: He has a normal mood and affect. His behavior is normal.   Vitals reviewed. Radiology: none    Assessment and Plan     Problem List        Pulmonary Problems    SOFIA (obstructive sleep apnea)     Advised to be compliant with the BIPAP  Loose weight            Other    SOB (shortness of breath) on exertion     Loose weight with diet and exercise           Excessive sleepiness     Advised to be compliant with the BIPAP  Loose weight         Morbid obesity (HCC)     Advised to loose weight with diet and exercise                      Return in about 1 year (around 4/26/2020).      Progress notes sent to the referring Provider    Germain Lindo MD  4/26/2019  11:18 AM

## 2019-08-06 ENCOUNTER — HOSPITAL ENCOUNTER (OUTPATIENT)
Dept: GENERAL RADIOLOGY | Age: 44
Discharge: HOME | End: 2019-08-06
Payer: COMMERCIAL

## 2019-08-06 DIAGNOSIS — K21.9 GASTROESOPHAGEAL REFLUX DISEASE WITHOUT ESOPHAGITIS: ICD-10-CM

## 2019-08-06 PROCEDURE — 74250 X-RAY XM SM INT 1CNTRST STD: CPT

## 2019-12-07 ENCOUNTER — HOSPITAL ENCOUNTER (EMERGENCY)
Age: 44
Discharge: HOME | End: 2019-12-07
Attending: EMERGENCY MEDICINE
Payer: COMMERCIAL

## 2019-12-07 ENCOUNTER — APPOINTMENT (OUTPATIENT)
Dept: CT IMAGING | Age: 44
End: 2019-12-07
Payer: COMMERCIAL

## 2019-12-07 VITALS
HEIGHT: 70 IN | TEMPERATURE: 97.5 F | OXYGEN SATURATION: 96 % | SYSTOLIC BLOOD PRESSURE: 135 MMHG | RESPIRATION RATE: 18 BRPM | DIASTOLIC BLOOD PRESSURE: 71 MMHG | WEIGHT: 315 LBS | BODY MASS INDEX: 45.1 KG/M2 | HEART RATE: 73 BPM

## 2019-12-07 DIAGNOSIS — R10.33 PERIUMBILICAL ABDOMINAL PAIN: Primary | ICD-10-CM

## 2019-12-07 DIAGNOSIS — K42.9 UMBILICAL HERNIA WITHOUT OBSTRUCTION AND WITHOUT GANGRENE: ICD-10-CM

## 2019-12-07 LAB
ALBUMIN SERPL-MCNC: 4.3 GM/DL (ref 3.4–5)
ALP BLD-CCNC: 73 IU/L (ref 40–128)
ALT SERPL-CCNC: 50 U/L (ref 10–40)
ANION GAP SERPL CALCULATED.3IONS-SCNC: 10 MMOL/L (ref 4–16)
AST SERPL-CCNC: 33 IU/L (ref 15–37)
BACTERIA: ABNORMAL /HPF
BASOPHILS ABSOLUTE: 0 K/CU MM
BASOPHILS RELATIVE PERCENT: 0.7 % (ref 0–1)
BILIRUB SERPL-MCNC: 0.2 MG/DL (ref 0–1)
BILIRUBIN URINE: NEGATIVE MG/DL
BLOOD, URINE: NEGATIVE
BUN BLDV-MCNC: 18 MG/DL (ref 6–23)
CALCIUM SERPL-MCNC: 9.3 MG/DL (ref 8.3–10.6)
CHLORIDE BLD-SCNC: 105 MMOL/L (ref 99–110)
CLARITY: CLEAR
CO2: 25 MMOL/L (ref 21–32)
COLOR: YELLOW
CREAT SERPL-MCNC: 1.3 MG/DL (ref 0.9–1.3)
DIFFERENTIAL TYPE: ABNORMAL
EOSINOPHILS ABSOLUTE: 0.2 K/CU MM
EOSINOPHILS RELATIVE PERCENT: 3.6 % (ref 0–3)
GFR AFRICAN AMERICAN: >60 ML/MIN/1.73M2
GFR NON-AFRICAN AMERICAN: >60 ML/MIN/1.73M2
GLUCOSE BLD-MCNC: 104 MG/DL (ref 70–99)
GLUCOSE, URINE: NEGATIVE MG/DL
HCT VFR BLD CALC: 43.9 % (ref 42–52)
HEMOGLOBIN: 14.2 GM/DL (ref 13.5–18)
IMMATURE NEUTROPHIL %: 0.5 % (ref 0–0.43)
KETONES, URINE: NEGATIVE MG/DL
LEUKOCYTE ESTERASE, URINE: NEGATIVE
LIPASE: 47 IU/L (ref 13–60)
LYMPHOCYTES ABSOLUTE: 1.6 K/CU MM
LYMPHOCYTES RELATIVE PERCENT: 26.4 % (ref 24–44)
MCH RBC QN AUTO: 30.3 PG (ref 27–31)
MCHC RBC AUTO-ENTMCNC: 32.3 % (ref 32–36)
MCV RBC AUTO: 93.6 FL (ref 78–100)
MONOCYTES ABSOLUTE: 0.6 K/CU MM
MONOCYTES RELATIVE PERCENT: 9.1 % (ref 0–4)
MUCUS: ABNORMAL HPF
NITRITE URINE, QUANTITATIVE: NEGATIVE
NUCLEATED RBC %: 0 %
PDW BLD-RTO: 12.6 % (ref 11.7–14.9)
PH, URINE: 5 (ref 5–8)
PLATELET # BLD: 173 K/CU MM (ref 140–440)
PMV BLD AUTO: 10.1 FL (ref 7.5–11.1)
POTASSIUM SERPL-SCNC: 4.1 MMOL/L (ref 3.5–5.1)
PROTEIN UA: NEGATIVE MG/DL
RBC # BLD: 4.69 M/CU MM (ref 4.6–6.2)
RBC URINE: ABNORMAL /HPF (ref 0–3)
SEGMENTED NEUTROPHILS ABSOLUTE COUNT: 3.7 K/CU MM
SEGMENTED NEUTROPHILS RELATIVE PERCENT: 59.7 % (ref 36–66)
SODIUM BLD-SCNC: 140 MMOL/L (ref 135–145)
SPECIFIC GRAVITY UA: 1.02 (ref 1–1.03)
SQUAMOUS EPITHELIAL: <1 /HPF
TOTAL IMMATURE NEUTOROPHIL: 0.03 K/CU MM
TOTAL NUCLEATED RBC: 0 K/CU MM
TOTAL PROTEIN: 6.8 GM/DL (ref 6.4–8.2)
TRANSITIONAL EPITHELIAL: <1 /HPF
TRICHOMONAS: ABNORMAL /HPF
UROBILINOGEN, URINE: NORMAL MG/DL (ref 0.2–1)
WBC # BLD: 6.1 K/CU MM (ref 4–10.5)
WBC UA: <1 /HPF (ref 0–2)

## 2019-12-07 PROCEDURE — 74177 CT ABD & PELVIS W/CONTRAST: CPT

## 2019-12-07 PROCEDURE — 6360000004 HC RX CONTRAST MEDICATION: Performed by: EMERGENCY MEDICINE

## 2019-12-07 PROCEDURE — 85025 COMPLETE CBC W/AUTO DIFF WBC: CPT

## 2019-12-07 PROCEDURE — 81001 URINALYSIS AUTO W/SCOPE: CPT

## 2019-12-07 PROCEDURE — 80053 COMPREHEN METABOLIC PANEL: CPT

## 2019-12-07 PROCEDURE — 99284 EMERGENCY DEPT VISIT MOD MDM: CPT

## 2019-12-07 PROCEDURE — 83690 ASSAY OF LIPASE: CPT

## 2019-12-07 RX ADMIN — IOPAMIDOL 75 ML: 755 INJECTION, SOLUTION INTRAVENOUS at 04:42

## 2019-12-07 ASSESSMENT — PAIN SCALES - GENERAL: PAINLEVEL_OUTOF10: 7

## 2019-12-07 ASSESSMENT — PAIN DESCRIPTION - LOCATION: LOCATION: ABDOMEN

## 2019-12-07 ASSESSMENT — PAIN DESCRIPTION - ORIENTATION: ORIENTATION: MID;UPPER

## 2019-12-07 ASSESSMENT — PAIN DESCRIPTION - PAIN TYPE: TYPE: ACUTE PAIN

## 2020-05-02 ENCOUNTER — APPOINTMENT (OUTPATIENT)
Dept: ULTRASOUND IMAGING | Age: 45
End: 2020-05-02
Payer: COMMERCIAL

## 2020-05-02 ENCOUNTER — APPOINTMENT (OUTPATIENT)
Dept: GENERAL RADIOLOGY | Age: 45
End: 2020-05-02
Payer: COMMERCIAL

## 2020-05-02 ENCOUNTER — APPOINTMENT (OUTPATIENT)
Dept: CT IMAGING | Age: 45
End: 2020-05-02
Payer: COMMERCIAL

## 2020-05-02 ENCOUNTER — HOSPITAL ENCOUNTER (EMERGENCY)
Age: 45
Discharge: HOME | End: 2020-05-02
Attending: EMERGENCY MEDICINE
Payer: COMMERCIAL

## 2020-05-02 VITALS
SYSTOLIC BLOOD PRESSURE: 148 MMHG | HEIGHT: 70 IN | TEMPERATURE: 97.9 F | RESPIRATION RATE: 16 BRPM | HEART RATE: 77 BPM | DIASTOLIC BLOOD PRESSURE: 88 MMHG | WEIGHT: 315 LBS | OXYGEN SATURATION: 98 % | BODY MASS INDEX: 45.1 KG/M2

## 2020-05-02 LAB
ALBUMIN SERPL-MCNC: 4.1 GM/DL (ref 3.4–5)
ALP BLD-CCNC: 71 IU/L (ref 40–128)
ALT SERPL-CCNC: 54 U/L (ref 10–40)
ANION GAP SERPL CALCULATED.3IONS-SCNC: 10 MMOL/L (ref 4–16)
AST SERPL-CCNC: 33 IU/L (ref 15–37)
BACTERIA: NEGATIVE /HPF
BASOPHILS ABSOLUTE: 0 K/CU MM
BASOPHILS RELATIVE PERCENT: 0.6 % (ref 0–1)
BILIRUB SERPL-MCNC: 0.3 MG/DL (ref 0–1)
BILIRUBIN URINE: NEGATIVE MG/DL
BLOOD, URINE: NEGATIVE
BUN BLDV-MCNC: 17 MG/DL (ref 6–23)
CALCIUM SERPL-MCNC: 9.5 MG/DL (ref 8.3–10.6)
CHLORIDE BLD-SCNC: 104 MMOL/L (ref 99–110)
CLARITY: CLEAR
CO2: 25 MMOL/L (ref 21–32)
COLOR: YELLOW
CREAT SERPL-MCNC: 1.3 MG/DL (ref 0.9–1.3)
DIFFERENTIAL TYPE: ABNORMAL
EOSINOPHILS ABSOLUTE: 0.1 K/CU MM
EOSINOPHILS RELATIVE PERCENT: 1.8 % (ref 0–3)
GFR AFRICAN AMERICAN: >60 ML/MIN/1.73M2
GFR NON-AFRICAN AMERICAN: >60 ML/MIN/1.73M2
GLUCOSE BLD-MCNC: 116 MG/DL (ref 70–99)
GLUCOSE, URINE: NEGATIVE MG/DL
HCT VFR BLD CALC: 42.4 % (ref 42–52)
HEMOGLOBIN: 14.1 GM/DL (ref 13.5–18)
IMMATURE NEUTROPHIL %: 1.1 % (ref 0–0.43)
KETONES, URINE: NEGATIVE MG/DL
LACTATE: 1.2 MMOL/L (ref 0.4–2)
LEUKOCYTE ESTERASE, URINE: NEGATIVE
LIPASE: 39 IU/L (ref 13–60)
LYMPHOCYTES ABSOLUTE: 2 K/CU MM
LYMPHOCYTES RELATIVE PERCENT: 29.9 % (ref 24–44)
MCH RBC QN AUTO: 31 PG (ref 27–31)
MCHC RBC AUTO-ENTMCNC: 33.3 % (ref 32–36)
MCV RBC AUTO: 93.2 FL (ref 78–100)
MONOCYTES ABSOLUTE: 0.6 K/CU MM
MONOCYTES RELATIVE PERCENT: 8.6 % (ref 0–4)
MUCUS: ABNORMAL HPF
NITRITE URINE, QUANTITATIVE: NEGATIVE
NUCLEATED RBC %: 0 %
PDW BLD-RTO: 13.1 % (ref 11.7–14.9)
PH, URINE: 5 (ref 5–8)
PLATELET # BLD: 167 K/CU MM (ref 140–440)
PMV BLD AUTO: 10 FL (ref 7.5–11.1)
POTASSIUM SERPL-SCNC: 4.2 MMOL/L (ref 3.5–5.1)
PROTEIN UA: NEGATIVE MG/DL
RBC # BLD: 4.55 M/CU MM (ref 4.6–6.2)
RBC URINE: ABNORMAL /HPF (ref 0–3)
SEGMENTED NEUTROPHILS ABSOLUTE COUNT: 3.9 K/CU MM
SEGMENTED NEUTROPHILS RELATIVE PERCENT: 58 % (ref 36–66)
SODIUM BLD-SCNC: 139 MMOL/L (ref 135–145)
SPECIFIC GRAVITY UA: 1.02 (ref 1–1.03)
TOTAL IMMATURE NEUTOROPHIL: 0.07 K/CU MM
TOTAL NUCLEATED RBC: 0 K/CU MM
TOTAL PROTEIN: 6.7 GM/DL (ref 6.4–8.2)
TRICHOMONAS: ABNORMAL /HPF
TROPONIN T: <0.01 NG/ML
UROBILINOGEN, URINE: NORMAL MG/DL (ref 0.2–1)
WBC # BLD: 6.7 K/CU MM (ref 4–10.5)
WBC UA: <1 /HPF (ref 0–2)

## 2020-05-02 PROCEDURE — 76870 US EXAM SCROTUM: CPT

## 2020-05-02 PROCEDURE — 93975 VASCULAR STUDY: CPT

## 2020-05-02 PROCEDURE — 2580000003 HC RX 258: Performed by: EMERGENCY MEDICINE

## 2020-05-02 PROCEDURE — 80053 COMPREHEN METABOLIC PANEL: CPT

## 2020-05-02 PROCEDURE — 83690 ASSAY OF LIPASE: CPT

## 2020-05-02 PROCEDURE — 84484 ASSAY OF TROPONIN QUANT: CPT

## 2020-05-02 PROCEDURE — 74177 CT ABD & PELVIS W/CONTRAST: CPT

## 2020-05-02 PROCEDURE — 81001 URINALYSIS AUTO W/SCOPE: CPT

## 2020-05-02 PROCEDURE — 99284 EMERGENCY DEPT VISIT MOD MDM: CPT

## 2020-05-02 PROCEDURE — 71045 X-RAY EXAM CHEST 1 VIEW: CPT

## 2020-05-02 PROCEDURE — 6360000004 HC RX CONTRAST MEDICATION: Performed by: EMERGENCY MEDICINE

## 2020-05-02 PROCEDURE — 85025 COMPLETE CBC W/AUTO DIFF WBC: CPT

## 2020-05-02 PROCEDURE — 93005 ELECTROCARDIOGRAM TRACING: CPT | Performed by: EMERGENCY MEDICINE

## 2020-05-02 PROCEDURE — 83605 ASSAY OF LACTIC ACID: CPT

## 2020-05-02 RX ORDER — FAMOTIDINE 20 MG/1
20 TABLET, FILM COATED ORAL 2 TIMES DAILY
Qty: 14 TABLET | Refills: 0 | Status: SHIPPED | OUTPATIENT
Start: 2020-05-02 | End: 2021-05-12

## 2020-05-02 RX ORDER — SODIUM CHLORIDE 0.9 % (FLUSH) 0.9 %
10 SYRINGE (ML) INJECTION 2 TIMES DAILY
Status: DISCONTINUED | OUTPATIENT
Start: 2020-05-02 | End: 2020-05-02 | Stop reason: HOSPADM

## 2020-05-02 RX ORDER — DICYCLOMINE HYDROCHLORIDE 10 MG/1
10 CAPSULE ORAL 3 TIMES DAILY
Qty: 15 CAPSULE | Refills: 3 | Status: SHIPPED | OUTPATIENT
Start: 2020-05-02 | End: 2021-05-12 | Stop reason: ALTCHOICE

## 2020-05-02 RX ORDER — NAPROXEN 500 MG/1
500 TABLET ORAL 2 TIMES DAILY
Qty: 60 TABLET | Refills: 0 | Status: SHIPPED | OUTPATIENT
Start: 2020-05-02 | End: 2021-05-12 | Stop reason: ALTCHOICE

## 2020-05-02 RX ORDER — SODIUM CHLORIDE, SODIUM LACTATE, POTASSIUM CHLORIDE, CALCIUM CHLORIDE 600; 310; 30; 20 MG/100ML; MG/100ML; MG/100ML; MG/100ML
1000 INJECTION, SOLUTION INTRAVENOUS ONCE
Status: COMPLETED | OUTPATIENT
Start: 2020-05-02 | End: 2020-05-02

## 2020-05-02 RX ORDER — ACETAMINOPHEN 325 MG/1
650 TABLET ORAL EVERY 6 HOURS PRN
Qty: 120 TABLET | Refills: 3 | Status: SHIPPED | OUTPATIENT
Start: 2020-05-02 | End: 2021-05-12 | Stop reason: CLARIF

## 2020-05-02 RX ORDER — ONDANSETRON 4 MG/1
4 TABLET, ORALLY DISINTEGRATING ORAL EVERY 8 HOURS PRN
Qty: 15 TABLET | Refills: 0 | Status: SHIPPED | OUTPATIENT
Start: 2020-05-02 | End: 2021-05-12

## 2020-05-02 RX ADMIN — IOPAMIDOL 80 ML: 755 INJECTION, SOLUTION INTRAVENOUS at 05:54

## 2020-05-02 RX ADMIN — SODIUM CHLORIDE, PRESERVATIVE FREE 10 ML: 5 INJECTION INTRAVENOUS at 05:54

## 2020-05-02 RX ADMIN — SODIUM CHLORIDE, POTASSIUM CHLORIDE, SODIUM LACTATE AND CALCIUM CHLORIDE 1000 ML: 600; 310; 30; 20 INJECTION, SOLUTION INTRAVENOUS at 05:38

## 2020-05-02 ASSESSMENT — ENCOUNTER SYMPTOMS
TROUBLE SWALLOWING: 0
DIARRHEA: 0
SORE THROAT: 0
BLOOD IN STOOL: 0
VOMITING: 0
NAUSEA: 0
FACIAL SWELLING: 0
SINUS PAIN: 0
BELCHING: 0
EYE PAIN: 0
EYE REDNESS: 0
STRIDOR: 0
VOICE CHANGE: 0
ABDOMINAL PAIN: 1
EYE ITCHING: 0
CHOKING: 0
SINUS PRESSURE: 0
PHOTOPHOBIA: 0
WHEEZING: 0
COLOR CHANGE: 0
CHEST TIGHTNESS: 0
SHORTNESS OF BREATH: 0
EYES NEGATIVE: 1
CONSTIPATION: 0
COUGH: 0
RECTAL PAIN: 0
RHINORRHEA: 0
EYE DISCHARGE: 0
RESPIRATORY NEGATIVE: 1
APNEA: 0
BACK PAIN: 0
HEMATEMESIS: 0

## 2020-05-02 ASSESSMENT — PAIN DESCRIPTION - FREQUENCY: FREQUENCY: INTERMITTENT

## 2020-05-02 ASSESSMENT — PAIN DESCRIPTION - ORIENTATION: ORIENTATION: RIGHT

## 2020-05-02 ASSESSMENT — PAIN DESCRIPTION - DESCRIPTORS: DESCRIPTORS: ACHING

## 2020-05-02 ASSESSMENT — PAIN SCALES - GENERAL: PAINLEVEL_OUTOF10: 6

## 2020-05-02 ASSESSMENT — PAIN DESCRIPTION - LOCATION: LOCATION: ABDOMEN

## 2020-05-02 ASSESSMENT — PAIN DESCRIPTION - PAIN TYPE: TYPE: ACUTE PAIN

## 2020-05-02 ASSESSMENT — PAIN DESCRIPTION - ONSET: ONSET: ON-GOING

## 2020-05-02 NOTE — ED PROVIDER NOTES
mL/min/1.73m2    Anion Gap 10 4 - 16   Lipase   Result Value Ref Range    Lipase 39 13 - 60 IU/L   Troponin   Result Value Ref Range    Troponin T <0.010 <0.01 NG/ML   Urinalysis, reflex to microscopic   Result Value Ref Range    Color, UA YELLOW YELLOW    Clarity, UA CLEAR CLEAR    Glucose, Urine NEGATIVE NEGATIVE MG/DL    Bilirubin Urine NEGATIVE NEGATIVE MG/DL    Ketones, Urine NEGATIVE NEGATIVE MG/DL    Specific Gravity, UA 1.018 1.001 - 1.035    Blood, Urine NEGATIVE NEGATIVE    pH, Urine 5.0 5.0 - 8.0    Protein, UA NEGATIVE NEGATIVE MG/DL    Urobilinogen, Urine NORMAL 0.2 - 1.0 MG/DL    Nitrite Urine, Quantitative NEGATIVE NEGATIVE    Leukocyte Esterase, Urine NEGATIVE NEGATIVE    RBC, UA NONE SEEN 0 - 3 /HPF    WBC, UA <1 0 - 2 /HPF    Bacteria, UA NEGATIVE NEGATIVE /HPF    Mucus, UA RARE (A) NEGATIVE HPF    Trichomonas, UA NONE SEEN NONE SEEN /HPF   Lactic Acid, Plasma   Result Value Ref Range    Lactate 1.2 0.4 - 2.0 mMOL/L   EKG 12 Lead   Result Value Ref Range    Ventricular Rate 84 BPM    Atrial Rate 84 BPM    P-R Interval 168 ms    QRS Duration 96 ms    Q-T Interval 380 ms    QTc Calculation (Bazett) 449 ms    P Axis 42 degrees    R Axis -6 degrees    T Axis 19 degrees    Diagnosis       Normal sinus rhythm  Normal ECG  No previous ECGs available         MDM:    Patient here with flank/abdominal pain awaiting labs/imaging. Patient rechecked, doing well, labs, imaging all negative. Patient discharged home given return precautions and follow up info. Final Impression:  1. Flank pain    2.  Abdominal pain, unspecified abdominal location        (Please note that portions of this note may have been completed with a voice recognition program. Efforts were made to edit the dictations but occasionally words are mis-transcribed.)    [facility] [address], DO  05/02/20 [id number]

## 2020-05-02 NOTE — ED PROVIDER NOTES
past\"    PONV (postoperative nausea and vomiting)     PONV    Short of breath on exertion     Sleep apnea     has sleep study 2014- use a c-pap machine for the past 8 weeks    Vertigo     \"noticed since I have been on the Topomax for weight loss\" No longer on topamax. Prior to Admission medications    Medication Sig Start Date End Date Taking? Authorizing Provider   cephALEXin (KEFLEX) 500 MG capsule Take 1 capsule by mouth 3 times daily 3/4/19   Yamilet Rob MD   tamsulosin [facility]) 0.4 MG capsule Take 1 capsule by mouth daily for 5 doses 3/3/19 3/8/19  Kristi Desai MD   ondansetron (ZOFRAN) 4 MG tablet Take 1 tablet by mouth every 8 hours as needed for Nausea or Vomiting 3/3/19   Kristi Desai MD   metoprolol tartrate (LOPRESSOR) 50 MG tablet Take 50 mg by mouth 2 times daily    Historical Provider, MD   dexlansoprazole (DEXILANT) 60 MG CPDR delayed release capsule Take 60 mg by mouth daily    Historical Provider, MD   docusate sodium (COLACE) 100 MG capsule Take 1 capsule by mouth daily as needed for Constipation (while taking pain medicine) 10/6/15   Jaclyn Almaraz PA-C   amLODIPine-olmesartan (FINESSE) 5-20 MG per tablet Take 1 tablet by mouth daily    Historical Provider, MD   PROVENTIL  (90 BASE) MCG/ACT inhaler Inhale 2 puffs into the lungs every 6 hours as needed for Wheezing. 6/4/14   Syed Grove MD   nebivolol (BYSTOLIC) 10 MG tablet Take 10 mg by mouth nightly.     Historical Provider, MD        Patient Active Problem List   Diagnosis    SOB (shortness of breath) on exertion    Snoring    Excessive sleepiness    Umbilical hernia without obstruction and without gangrene    SOFIA (obstructive sleep apnea)    Morbid obesity ([address]    Ureteral stone         SURGICAL HISTORY       Past Surgical History:   Procedure Laterality Date    CARPAL TUNNEL RELEASE Right 2012    CHOLECYSTECTOMY  9/9/14    CYSTOSCOPY Right 3/4/2019    CYSTOSCOPY RETROGRADE PYLEOGRAMS STONE Coordination normal.      Gait: Gait normal.      Deep Tendon Reflexes: Reflexes normal.   Psychiatric:         Mood and Affect: Mood normal.         Behavior: Behavior normal.         Thought Content: Thought content normal.         Judgment: Judgment normal.         DIAGNOSTIC RESULTS     Labs Reviewed   CBC WITH AUTO DIFFERENTIAL - Abnormal; Notable for the following components:       Result Value    RBC 4.55 (*)     Monocytes % 8.6 (*)     Immature Neutrophil % 1.1 (*)     All other components within normal limits   COMPREHENSIVE METABOLIC PANEL W/ REFLEX TO MG FOR LOW K - Abnormal; Notable for the following components:    Glucose 116 (*)     ALT 54 (*)     All other components within normal limits   LIPASE   TROPONIN   LACTIC ACID, PLASMA   URINALYSIS          EKG: All EKG's are interpreted by the Emergency Department Physician who either signs or Co-signs this chart in the absence of a cardiologist.       EKG Interpretation    Interpreted by emergency department physician    Rhythm: normal sinus   Rate: normal  Axis: normal  Ectopy: none  Conduction: normal  ST Segments: no acute change  T Waves: no acute change  Q Waves: none    Clinical Impression: no acute changes    Belen Bernheim     RADIOLOGY:     Non-plain film images such as CT, Ultrasound and MRI are read by the radiologist. Plain radiographic images are visualized and preliminarily interpreted by the emergency physician. Interpretation per the Radiologist below, if available at the time of this note:    XR CHEST PORTABLE   Final Result   No radiographic evidence of acute cardiopulmonary process. No significant   interval change.          CT ABDOMEN PELVIS W IV CONTRAST Additional Contrast? None    (Results Pending)   US SCROTUM AND TESTICLES    (Results Pending)   US DUP ABD PEL RETRO SCROT COMPLETE    (Results Pending)         ED BEDSIDE ULTRASOUND:   Performed by ED Physician Belen Bernheim,        LABS:  Labs Reviewed   CBC WITH AUTO

## 2020-05-03 PROCEDURE — 93010 ELECTROCARDIOGRAM REPORT: CPT | Performed by: INTERNAL MEDICINE

## 2020-05-05 LAB
EKG ATRIAL RATE: 84 BPM
EKG DIAGNOSIS: NORMAL
EKG P AXIS: 42 DEGREES
EKG P-R INTERVAL: 168 MS
EKG Q-T INTERVAL: 380 MS
EKG QRS DURATION: 96 MS
EKG QTC CALCULATION (BAZETT): 449 MS
EKG R AXIS: -6 DEGREES
EKG T AXIS: 19 DEGREES
EKG VENTRICULAR RATE: 84 BPM

## 2020-06-30 ENCOUNTER — HOSPITAL ENCOUNTER (EMERGENCY)
Age: 45
Discharge: HOME | End: 2020-06-30
Attending: EMERGENCY MEDICINE
Payer: COMMERCIAL

## 2020-06-30 ENCOUNTER — APPOINTMENT (OUTPATIENT)
Dept: GENERAL RADIOLOGY | Age: 45
End: 2020-06-30
Payer: COMMERCIAL

## 2020-06-30 VITALS
TEMPERATURE: 97.9 F | HEIGHT: 70 IN | BODY MASS INDEX: 45.1 KG/M2 | RESPIRATION RATE: 16 BRPM | SYSTOLIC BLOOD PRESSURE: 148 MMHG | OXYGEN SATURATION: 95 % | DIASTOLIC BLOOD PRESSURE: 86 MMHG | WEIGHT: 315 LBS | HEART RATE: 82 BPM

## 2020-06-30 LAB
ALBUMIN SERPL-MCNC: 4.3 GM/DL (ref 3.4–5)
ALP BLD-CCNC: 74 IU/L (ref 40–128)
ALT SERPL-CCNC: 49 U/L (ref 10–40)
ANION GAP SERPL CALCULATED.3IONS-SCNC: 8 MMOL/L (ref 4–16)
AST SERPL-CCNC: 33 IU/L (ref 15–37)
BASOPHILS ABSOLUTE: 0 K/CU MM
BASOPHILS RELATIVE PERCENT: 0.4 % (ref 0–1)
BILIRUB SERPL-MCNC: 0.2 MG/DL (ref 0–1)
BUN BLDV-MCNC: 13 MG/DL (ref 6–23)
CALCIUM SERPL-MCNC: 9.8 MG/DL (ref 8.3–10.6)
CHLORIDE BLD-SCNC: 106 MMOL/L (ref 99–110)
CO2: 25 MMOL/L (ref 21–32)
CREAT SERPL-MCNC: 1.2 MG/DL (ref 0.9–1.3)
DIFFERENTIAL TYPE: ABNORMAL
EOSINOPHILS ABSOLUTE: 0 K/CU MM
EOSINOPHILS RELATIVE PERCENT: 0.1 % (ref 0–3)
GFR AFRICAN AMERICAN: >60 ML/MIN/1.73M2
GFR NON-AFRICAN AMERICAN: >60 ML/MIN/1.73M2
GLUCOSE BLD-MCNC: 133 MG/DL (ref 70–99)
HCT VFR BLD CALC: 45.2 % (ref 42–52)
HEMOGLOBIN: 15 GM/DL (ref 13.5–18)
IMMATURE NEUTROPHIL %: 1.6 % (ref 0–0.43)
LYMPHOCYTES ABSOLUTE: 1 K/CU MM
LYMPHOCYTES RELATIVE PERCENT: 13 % (ref 24–44)
MCH RBC QN AUTO: 30.9 PG (ref 27–31)
MCHC RBC AUTO-ENTMCNC: 33.2 % (ref 32–36)
MCV RBC AUTO: 93.2 FL (ref 78–100)
MONOCYTES ABSOLUTE: 0.2 K/CU MM
MONOCYTES RELATIVE PERCENT: 2.4 % (ref 0–4)
NUCLEATED RBC %: 0 %
PDW BLD-RTO: 12.2 % (ref 11.7–14.9)
PLATELET # BLD: 198 K/CU MM (ref 140–440)
PMV BLD AUTO: 10.6 FL (ref 7.5–11.1)
POTASSIUM SERPL-SCNC: 4.7 MMOL/L (ref 3.5–5.1)
RBC # BLD: 4.85 M/CU MM (ref 4.6–6.2)
SEGMENTED NEUTROPHILS ABSOLUTE COUNT: 6.2 K/CU MM
SEGMENTED NEUTROPHILS RELATIVE PERCENT: 82.5 % (ref 36–66)
SODIUM BLD-SCNC: 139 MMOL/L (ref 135–145)
TOTAL IMMATURE NEUTOROPHIL: 0.12 K/CU MM
TOTAL NUCLEATED RBC: 0 K/CU MM
TOTAL PROTEIN: 7.5 GM/DL (ref 6.4–8.2)
TROPONIN T: <0.01 NG/ML
WBC # BLD: 7.5 K/CU MM (ref 4–10.5)

## 2020-06-30 PROCEDURE — 93005 ELECTROCARDIOGRAM TRACING: CPT | Performed by: EMERGENCY MEDICINE

## 2020-06-30 PROCEDURE — 6370000000 HC RX 637 (ALT 250 FOR IP): Performed by: EMERGENCY MEDICINE

## 2020-06-30 PROCEDURE — 85025 COMPLETE CBC W/AUTO DIFF WBC: CPT

## 2020-06-30 PROCEDURE — 80053 COMPREHEN METABOLIC PANEL: CPT

## 2020-06-30 PROCEDURE — 84484 ASSAY OF TROPONIN QUANT: CPT

## 2020-06-30 PROCEDURE — 73130 X-RAY EXAM OF HAND: CPT

## 2020-06-30 PROCEDURE — 71045 X-RAY EXAM CHEST 1 VIEW: CPT

## 2020-06-30 PROCEDURE — 99285 EMERGENCY DEPT VISIT HI MDM: CPT

## 2020-06-30 RX ORDER — ASPIRIN 325 MG
325 TABLET ORAL ONCE
Status: COMPLETED | OUTPATIENT
Start: 2020-06-30 | End: 2020-06-30

## 2020-06-30 RX ADMIN — ASPIRIN 325 MG ORAL TABLET 325 MG: 325 PILL ORAL at 02:18

## 2020-06-30 ASSESSMENT — ENCOUNTER SYMPTOMS
EYE REDNESS: 0
BACK PAIN: 0
VOMITING: 0
RHINORRHEA: 0
SORE THROAT: 0
ABDOMINAL PAIN: 0
NAUSEA: 0
COUGH: 0
SHORTNESS OF BREATH: 0

## 2020-06-30 ASSESSMENT — PAIN SCALES - GENERAL: PAINLEVEL_OUTOF10: 1

## 2020-06-30 NOTE — ED PROVIDER NOTES
a kidney stone now- have passed 2 or 3 in the past\"    PONV (postoperative nausea and vomiting)     PONV    Short of breath on exertion     Sleep apnea     has sleep study 2014- use a c-pap machine for the past 8 weeks    Vertigo     \"noticed since I have been on the Topomax for weight loss\" No longer on topamax. Past Surgical History:   Procedure Laterality Date    CARPAL TUNNEL RELEASE Right 2012    CHOLECYSTECTOMY  9/9/14    CYSTOSCOPY Right 3/4/2019    CYSTOSCOPY RETROGRADE PYLEOGRAMS STONE MANIPULATION, with stent insertion performed by Howard Reyna MD at [address] Right 2001    \"Swollen lymph node\"    HAND SURGERY Right 1999    Thumb reconstruction    HERNIA REPAIR Right last one 2013    inguinal x 5    TONSILLECTOMY AND ADENOIDECTOMY   age of 9 yrs   [facility] UMBILICAL HERNIA REPAIR  8/5/45    UMBILICAL HERNIA REPAIR  10/06/15    laparoscopic     Family History   Problem Relation Age of Onset    Other Mother         Factor Five    Cancer Maternal Grandmother         leukemia and Gallbladder CA    Cancer Paternal Grandmother         GI    Heart Disease Paternal Grandfather      Social History     Socioeconomic History    Marital status:      Spouse name: Not on file    Number of children: Not on file    Years of education: Not on file    Highest education level: Not on file   Occupational History    Not on file   Social Needs    Financial resource strain: Not on file    Food insecurity     Worry: Not on file     Inability: Not on file    Transportation needs     Medical: Not on file     Non-medical: Not on file   Tobacco Use    Smoking status: Never Smoker    Smokeless tobacco: Never Used   Substance and Sexual Activity    Alcohol use: No     Comment:         CAFFEINE: 48 oz [employer] daily.     Drug use: No    Sexual activity: Not on file   Lifestyle    Physical activity     Days per week: Not on file     Minutes per session: Not on file    Stress: Not on file   Relationships    Social connections     Talks on phone: Not on file     Gets together: Not on file     Attends Sabianist service: Not on file     Active member of club or organization: Not on file     Attends meetings of clubs or organizations: Not on file     Relationship status: Not on file    Intimate partner violence     Fear of current or ex partner: Not on file     Emotionally abused: Not on file     Physically abused: Not on file     Forced sexual activity: Not on file   Other Topics Concern    Not on file   Social History Narrative    Not on file     No current facility-administered medications for this encounter.       Current Outpatient Medications   Medication Sig Dispense Refill    famotidine (PEPCID) 20 MG tablet Take 1 tablet by mouth 2 times daily 14 tablet 0    dicyclomine (BENTYL) 10 MG capsule Take 1 capsule by mouth 3 times daily As needed for abdominal pain 15 capsule 3    naproxen (NAPROSYN) 500 MG tablet Take 1 tablet by mouth 2 times daily 60 tablet 0    acetaminophen (TYLENOL) 325 MG tablet Take 2 tablets by mouth every 6 hours as needed for Pain 120 tablet 3    ondansetron (ZOFRAN ODT) 4 MG disintegrating tablet Take 1 tablet by mouth every 8 hours as needed for Nausea 15 tablet 0    cephALEXin (KEFLEX) 500 MG capsule Take 1 capsule by mouth 3 times daily 15 capsule 0    tamsulosin (FLOMAX) 0.4 MG capsule Take 1 capsule by mouth daily for 5 doses 5 capsule 0    ondansetron (ZOFRAN) 4 MG tablet Take 1 tablet by mouth every 8 hours as needed for Nausea or Vomiting 30 tablet 0    metoprolol tartrate (LOPRESSOR) 50 MG tablet Take 50 mg by mouth 2 times daily      dexlansoprazole (DEXILANT) 60 MG CPDR delayed release capsule Take 60 mg by mouth daily      docusate sodium (COLACE) 100 MG capsule Take 1 capsule by mouth daily as needed for Constipation (while taking pain medicine) 30 capsule 3    amLODIPine-olmesartan (FINESSE) 5-20 MG per tablet Take 1 tablet by mouth daily      PROVENTIL  (90 BASE) MCG/ACT inhaler Inhale 2 puffs into the lungs every 6 hours as needed for Wheezing. 1 Inhaler 3    nebivolol (BYSTOLIC) 10 MG tablet Take 10 mg by mouth nightly. No Known Allergies    Nursing Notes Reviewed     Physical Exam:   ED Triage Vitals [06/30/20 0202]   Enc Vitals Group      BP       Pulse       Resp       Temp       Temp src       SpO2       Weight (!) 340 lb (154.2 kg)      Height 5' 10\" (1.778 m)      Head Circumference       Peak Flow       Pain Score       Pain Loc       Pain Edu? Excl. in [address]? BP (!) 148/86   Pulse 82   Temp 97.9 °F (36.6 °C) (Oral)   Resp 16   Ht 5' 10\" (1.778 m)   Wt (!) 340 lb (154.2 kg)   SpO2 95%   BMI 48.78 kg/m²   My pulse ox interpretation is - normal  Physical Exam  Constitutional:       General: He is not in acute distress. Appearance: Normal appearance. He is not diaphoretic. HENT:      Head: Normocephalic and atraumatic. Eyes:      General:         Right eye: No discharge. Left eye: No discharge. Conjunctiva/sclera: Conjunctivae normal.   Cardiovascular:      Rate and Rhythm: Normal rate and regular rhythm. Pulses: Normal pulses. Radial pulses are 2+ on the right side and 2+ on the left side. Pulmonary:      Effort: Pulmonary effort is normal. No respiratory distress. Breath sounds: Normal breath sounds. No wheezing or rales. Abdominal:      General: There is no distension. Tenderness: There is no abdominal tenderness. Musculoskeletal: Normal range of motion. General: No swelling or tenderness. Skin:     General: Skin is warm and dry. Neurological:      General: No focal deficit present. Mental Status: He is alert. Cranial Nerves: No cranial nerve deficit.    Psychiatric:         Mood and Affect: Mood normal.         Behavior: Behavior normal.         I have reviewed and interpreted all of the currently available lab results from this visit (if applicable):  Results for orders placed or performed during the hospital encounter of 06/30/20   CBC Auto Differential   Result Value Ref Range    WBC 7.5 4.0 - 10.5 K/CU MM    RBC 4.85 4.6 - 6.2 M/CU MM    Hemoglobin 15.0 13.5 - 18.0 GM/DL    Hematocrit 45.2 42 - 52 %    MCV 93.2 78 - 100 FL    MCH 30.9 27 - 31 PG    MCHC 33.2 32.0 - 36.0 %    RDW 12.2 11.7 - 14.9 %    Platelets 278 441 - 611 K/CU MM    MPV 10.6 7.5 - 11.1 FL    Differential Type AUTOMATED DIFFERENTIAL     Segs Relative 82.5 (H) 36 - 66 %    Lymphocytes % 13.0 (L) 24 - 44 %    Monocytes % 2.4 0 - 4 %    Eosinophils % 0.1 0 - 3 %    Basophils % 0.4 0 - 1 %    Segs Absolute 6.2 K/CU MM    Lymphocytes Absolute 1.0 K/CU MM    Monocytes Absolute 0.2 K/CU MM    Eosinophils Absolute 0.0 K/CU MM    Basophils Absolute 0.0 K/CU MM    Nucleated RBC % 0.0 %    Total Nucleated RBC 0.0 K/CU MM    Total Immature Neutrophil 0.12 K/CU MM    Immature Neutrophil % 1.6 (H) 0 - 0.43 %   Comprehensive Metabolic Panel w/ Reflex to MG   Result Value Ref Range    Sodium 139 135 - 145 MMOL/L    Potassium 4.7 3.5 - 5.1 MMOL/L    Chloride 106 99 - 110 mMol/L    CO2 25 21 - 32 MMOL/L    BUN 13 6 - 23 MG/DL    CREATININE 1.2 0.9 - 1.3 MG/DL    Glucose 133 (H) 70 - 99 MG/DL    Calcium 9.8 8.3 - 10.6 MG/DL    Alb 4.3 3.4 - 5.0 GM/DL    Total Protein 7.5 6.4 - 8.2 GM/DL    Total Bilirubin 0.2 0.0 - 1.0 MG/DL    ALT 49 (H) 10 - 40 U/L    AST 33 15 - 37 IU/L    Alkaline Phosphatase 74 40 - 128 IU/L    GFR Non-African American >60 >60 mL/min/1.73m2    GFR African American >60 >60 mL/min/1.73m2    Anion Gap 8 4 - 16   Troponin   Result Value Ref Range    Troponin T <0.010 <0.01 NG/ML   Troponin   Result Value Ref Range    Troponin T <0.010 <0.01 NG/ML   Troponin   Result Value Ref Range    Troponin T <0.010 <0.01 NG/ML   EKG 12 Lead   Result Value Ref Range    Ventricular Rate 88 BPM    Atrial Rate 88 BPM    P-R Interval 150 ms    QRS Duration 104 ms    Q-T Interval 356 ms    QTc Calculation (Bazett) 430 ms    P Axis 46 degrees    R Axis -2 degrees    T Axis 16 degrees    Diagnosis       Normal sinus rhythm  Normal ECG  When compared with ECG of 02-MAY-2020 04:25,  No significant change was found        Radiographs (if obtained):  [] The following radiograph was interpreted by myself in the absence of a radiologist:  [x]Radiologist's Report Reviewed:  XR HAND RIGHT (MIN 3 VIEWS)   Final Result   Soft tissue swelling of the right hand. No acute osseous abnormality. XR CHEST PORTABLE   Final Result   Stable mild cardiomegaly. Mild pulmonary vascular congestion. Low lung volumes. EKG (if obtained): (All EKG's are interpreted by myself in the absence of a cardiologist)  Normal sinus rhythm with a rate of 88. [place] interval 150, , QTc 430. No ST elevations or depressions. Normal T waves. Impression: Normal EKG. When compared to previous EKG from 5/2/2020, there are no significant changes. MDM:  Differential diagnoses considered include but are not limited to acute coronary syndrome, Pulmonary embolism, pneumothorax, pneumonia, costochondritis, aortic dissection, GERD    ED course:   EKG is not concerning for acute ischemia. Patient given full dose aspirin upon arrival to the emergency department. Basic labs are obtained and unremarkable. Initial troponin is negative. Patient's heart score is a 2, delta troponin was obtained and is also negative. Do not suspect acute coronary syndrome is causing patient symptoms. Pt is low risk for PE by Well's criteria and PERC negative. No further work up for pulmonary embolism is warranted. I do not suspect pulmonary embolism. Chest x-ray shows mild cardiomegaly and mild pulmonary vascular congestion. He does not have any shortness of breath and maintain normal oxygen saturations throughout his stay in the emergency department.   I do not suspect that chest x-ray findings are contributing to the for acute coronary events despite 2 negative troponins with patient. Patient agrees to follow up with her primary care physician or cardiologist within the next 72 hours. Concerning signs and symptoms warranting a return visit to the Emergency Department were explained in detail. All questions and concerns were addressed to the patient's satisfaction. Patient understood and agreed with plan. I did don appropriate PPE (including N95 face mask, protective eye ware/safety glasses, gloves, hair covering, and - isolation gown), as recommended by the health facility/national standard best practice, during my bedside interactions with the patient. The likelihood of other entities in the differential is insufficient to justify any further testing for them. This was explained to the patient. The patient was advised that persistent or worsening symptoms would requirefurther evaluation. Clinical Impression:  1. Chest pain, unspecified type          Mehreen Almanza MD       Please note that portions of this note may have been complete with a voice recognition program.  Effortswere made to edit the dictations, but occasional words are mis-transcribed.           Mehreen Almanza MD  06/30/20 [id number]

## 2020-06-30 NOTE — ED TRIAGE NOTES
Pt states that he woke up at [place] [address] with left side chest pain, and numbness in the left arm, pt denies having SOB. Pt states that he has hypertension, but no cardiac history.

## 2020-07-07 LAB
EKG ATRIAL RATE: 88 BPM
EKG DIAGNOSIS: NORMAL
EKG P AXIS: 46 DEGREES
EKG P-R INTERVAL: 150 MS
EKG Q-T INTERVAL: 356 MS
EKG QRS DURATION: 104 MS
EKG QTC CALCULATION (BAZETT): 430 MS
EKG R AXIS: -2 DEGREES
EKG T AXIS: 16 DEGREES
EKG VENTRICULAR RATE: 88 BPM

## 2021-03-10 ENCOUNTER — OFFICE VISIT (OUTPATIENT)
Dept: BARIATRICS/WEIGHT MGMT | Age: 46
End: 2021-03-10
Payer: COMMERCIAL

## 2021-03-10 VITALS
HEIGHT: 69 IN | SYSTOLIC BLOOD PRESSURE: 124 MMHG | DIASTOLIC BLOOD PRESSURE: 82 MMHG | OXYGEN SATURATION: 98 % | TEMPERATURE: 98 F | HEART RATE: 59 BPM | WEIGHT: 315 LBS | BODY MASS INDEX: 46.65 KG/M2 | RESPIRATION RATE: 14 BRPM

## 2021-03-10 DIAGNOSIS — E66.01 MORBID OBESITY WITH BMI OF 45.0-49.9, ADULT (HCC): Primary | ICD-10-CM

## 2021-03-10 PROCEDURE — 99204 OFFICE O/P NEW MOD 45 MIN: CPT | Performed by: SURGERY

## 2021-03-10 RX ORDER — OMEPRAZOLE 20 MG/1
20 CAPSULE, DELAYED RELEASE ORAL DAILY
COMMUNITY
End: 2021-05-12

## 2021-03-10 ASSESSMENT — ENCOUNTER SYMPTOMS
DIARRHEA: 0
ABDOMINAL PAIN: 1
VOMITING: 0
ANAL BLEEDING: 0
CONSTIPATION: 0
BACK PAIN: 1
WHEEZING: 0
NAUSEA: 0
BLOOD IN STOOL: 0
PHOTOPHOBIA: 0
COLOR CHANGE: 0
TROUBLE SWALLOWING: 0
VOICE CHANGE: 0
SORE THROAT: 0
SHORTNESS OF BREATH: 1
COUGH: 0

## 2021-03-10 NOTE — PROGRESS NOTES
Bariatric Surgery Consultation    Darryle Bedford 1975, 41 y.o.,  male, CSN:   03/10/21     Chief Complaint:    Chief Complaint   Patient presents with    Weight Management     New  Visit       SUBJECTIVE:  Michael Gregorio is a 41 y.o. male being seen for morbid obesity, considering weight loss surgery; Corbin's, Height: 5' 9\" (175.3 cm), Weight: (!) 333 lb 4.8 oz (151.2 kg), Current Body mass index is 49.22 kg/m². The patient's PCP is the referring physician Uriel Macdonald MD    HPI:  Michael Gregorio has suffered from overweight / severe obesity for (30) years, and was of gradual onset but progressive course - tried MANY different diets and on and off over the weeks, months and years mood changes 12 hernias repaired, and now has the 13th and 14th, Dr Arely Tripathi, tried to loose weight the last yr, needs to loose weight FIRST. Madelaine Gabriel  and work status is works consultant self employed. Travel for living. and has 1 children. The patient first recognized that he had a weight problem about 20 years ago. The patient's lowest weight in the last five years was 320 lbs, and the highest weight in the last five years was 360 lbs. Weight Loss Program History:  The patient states he has tried water, reduced portions. The most weight lost ever with diet and exercise was 15 Lbs, but unfortunately only kept them of for 5years. These attempts have been temporarily successful with weight loss, but have failed to sustain adequate weight loss. Mortality from the morbid obesity is very high:       Corbin's life is significantly affected by weight related to his co-morbidities. The patient has also tried but failed self directed diet and exercise.     Comorbid Conditions:  Significant diseases affecting this patient are   Past Medical History:   Diagnosis Date    COPD (chronic obstructive pulmonary disease) ([facility]     see Dr Elida Meza GERD (gastroesophageal reflux disease)     before gallbladder removed now it's ok    Hypertension     \"had since 2005- see Dr Karolyn Elena\"they did a stress and echo after I came with chest pain 7/2014- not my heart the tests all clear- it was the gallbladder\"    Kidney stone     \"have a kidney stone now- have passed 2 or 3 in the past\"    PONV (postoperative nausea and vomiting)     PONV    Short of breath on exertion     Sleep apnea     has sleep study 2014- use a c-pap machine for the past 8 weeks    Vertigo     \"noticed since I have been on the Topomax for weight loss\" No longer on topamax. And     Review of Systems - Review of Systems   Constitutional: Positive for activity change, appetite change, fatigue and unexpected weight change. Negative for chills, diaphoresis and fever. HENT: Negative for sore throat, trouble swallowing and voice change. Eyes: Negative for photophobia and visual disturbance. Respiratory: Positive for shortness of breath. Negative for cough and wheezing. Cardiovascular: Negative for chest pain and palpitations. Gastrointestinal: Positive for abdominal pain (Hernias). Negative for anal bleeding, blood in stool, constipation, diarrhea, nausea and vomiting. Endocrine: Positive for polydipsia. Negative for cold intolerance, heat intolerance and polyuria. Genitourinary: Negative for dysuria, frequency and hematuria. Musculoskeletal: Positive for arthralgias and back pain (Lower). Negative for joint swelling, myalgias and neck stiffness. Skin: Negative for color change and rash. Neurological: Negative for seizures, speech difficulty, light-headedness and numbness. Hematological: Negative for adenopathy. Does not bruise/bleed easily. All others reviewed and are negative.     Allergies:  No Known Allergies    Medications:  Current Outpatient Medications   Medication Sig Dispense Refill    omeprazole (PRILOSEC) 20 MG delayed release capsule Take 20 mg by mouth daily      metoprolol tartrate (LOPRESSOR) 50 MG tablet Take 50 mg by mouth 2 times daily      amLODIPine-olmesartan (FINESSE) 5-20 MG per tablet Take 1 tablet by mouth daily      famotidine (PEPCID) 20 MG tablet Take 1 tablet by mouth 2 times daily (Patient not taking: Reported on 3/10/2021) 14 tablet 0    dicyclomine (BENTYL) 10 MG capsule Take 1 capsule by mouth 3 times daily As needed for abdominal pain (Patient not taking: Reported on 3/10/2021) 15 capsule 3    naproxen (NAPROSYN) 500 MG tablet Take 1 tablet by mouth 2 times daily (Patient not taking: Reported on 3/10/2021) 60 tablet 0    acetaminophen (TYLENOL) 325 MG tablet Take 2 tablets by mouth every 6 hours as needed for Pain (Patient not taking: Reported on 3/10/2021) 120 tablet 3    ondansetron (ZOFRAN ODT) 4 MG disintegrating tablet Take 1 tablet by mouth every 8 hours as needed for Nausea (Patient not taking: Reported on 3/10/2021) 15 tablet 0    cephALEXin (KEFLEX) 500 MG capsule Take 1 capsule by mouth 3 times daily (Patient not taking: Reported on 3/10/2021) 15 capsule 0    tamsulosin (FLOMAX) 0.4 MG capsule Take 1 capsule by mouth daily for 5 doses 5 capsule 0    ondansetron (ZOFRAN) 4 MG tablet Take 1 tablet by mouth every 8 hours as needed for Nausea or Vomiting (Patient not taking: Reported on 3/10/2021) 30 tablet 0    dexlansoprazole (DEXILANT) 60 MG CPDR delayed release capsule Take 60 mg by mouth daily      docusate sodium (COLACE) 100 MG capsule Take 1 capsule by mouth daily as needed for Constipation (while taking pain medicine) (Patient not taking: Reported on 3/10/2021) 30 capsule 3    PROVENTIL  (90 BASE) MCG/ACT inhaler Inhale 2 puffs into the lungs every 6 hours as needed for Wheezing. (Patient not taking: Reported on 3/10/2021) 1 Inhaler 3    nebivolol (BYSTOLIC) 10 MG tablet Take 10 mg by mouth nightly. No current facility-administered medications for this visit.         Past Surgical History:  Past Surgical History:   Procedure Laterality Date    CARPAL TUNNEL RELEASE or ex partner: Not on file     Emotionally abused: Not on file     Physically abused: Not on file     Forced sexual activity: Not on file   Other Topics Concern    Not on file   Social History Narrative    Not on file         OBJECTIVE:     Physical Exam   /82   Pulse 59   Temp 98 °F (36.7 °C) (Infrared)   Resp 14   Ht 5' 9\" (1.753 m)   Wt (!) 333 lb 4.8 oz (151.2 kg)   SpO2 98%   BMI 49.22 kg/m²    Constitutional:  Vital signs are normal. The patient appears well-developed and well-nourished. Head: Normocephalic. Neck: No mass and no thyromegaly present. Cardiovascular: Normal rate, regular rhythm, S1 normal and S2 normal.  No murmurs. Edema   Pulmonary/Chest: Effort normal and breath sounds normal.   Abdominal: Soft. Normal appearance. There is no splenomegaly, but fatty liver. No tenderness. There is no rigidity, no rebound, no guarding and no Nuñez's sign. Musculoskeletal:        Right lower leg: Normal. No tenderness and no edema. Left lower leg: Normal. No tenderness and no edema. Lymphadenopathy:     No cervical adenopathy. No axillary adenopathy. Skin: Skin is warm, dry and intact. No rash. Psychiatric: The patient is alert and oriented. The patient has a normal mood and affect. Speech is normal and behavior is normal. Judgment and thought content normal. Cognition and memory are normal.     ASSESSMENT & PLAN:    Patient Active Problem List   Diagnosis    SOB (shortness of breath) on exertion    Snoring    Excessive sleepiness    Umbilical hernia without obstruction and without gangrene    SOFIA (obstructive sleep apnea)    Morbid obesity (HCC)    Ureteral stone    Morbid obesity with BMI of 45.0-49.9, adult ([address]       Needs to loose weight, NOT Diabetic. And will benefit from the sleeve gastrectomy     The patient is good candidate for surgery. The patient is interested in the RoboticSleeve Gastrectomy. Will continue work up toward surgery.     Counseled extensively regardin) DIET and MONITOR the Weight:  - 1500 Kcal Diet/day. - Write down everything you eat and drink for 1 wk  - No calories from drinks  - Slim fast diet: 4 cans per day 1-2 days a week with only NO caloriesdrinks those days    2) EXERCISE: 1 hr/day 5 days a week    3) DIETITIAN / NUTRITIONAL CONSULT, evaluation and counseling to jackson healthy diet ~1500 Kcal /day    4) EXERCISE PHYSIOLOGIST CONSULT    5)PSYCHOLOGICAL EVALUATION    6) MONTHLY FOLLOW UP,  to follow and document diet and exercise trial    7) Planning a Sleeve Gastrectomy in few months    8) 2 Pictures were taken today to concretely monitorweight loss over time. 9) CARDIOLOGY OPTIMIZATION AND CLEARANCE BEFORE SURGERY    10) PULMONOLOGY OPTIMIZATION AND CLEARANCE BEFORE SURGERY    WILL CHECK BASELINE BARIATRIC COMPREHENSIVE LABS. Orders Placed This Encounter   Procedures    Basic Metabolic Panel    CBC Auto Differential    Hemoglobin A1C    Hepatic Function Panel    Lipid Panel    TSH without Reflex    Amb Referral to Nutrition Services    Ambulatory referral to Physical Therapy        Pt was handed a food diary notebook to help monitor Chance intake, and patientinformation pamphlet on Sleeve Gastrectomy. I counseled the patient regarding weight loss, appropriate diet and exercise, and healthy eating habits.    - Eat slow, and chew 50-60 times before swallowing  - Eat smaller portions in smaller plates  - Weigh yourself at least 2-3 times a week    The patient will be scheduled for a follow up visit in Return in about 4 weeks (around 2021) for Bariatric follow up: diet, exercise & weight loss, For imaging and tests results review. .    Bariatric 1200 calorie diet, RA, heart healthy, 60-80 gram protein.    ____________________________________________    Sachin Marx MD, FACS, FICS  Member of the American Society of Metabolic and Bariatric Surgeons    3/10/2021  11:28 AM

## 2021-03-15 ENCOUNTER — HOSPITAL ENCOUNTER (OUTPATIENT)
Age: 46
Discharge: HOME | End: 2021-03-15
Payer: COMMERCIAL

## 2021-03-15 LAB
ALBUMIN SERPL-MCNC: 4.1 GM/DL (ref 3.4–5)
ALP BLD-CCNC: 71 IU/L (ref 40–129)
ALT SERPL-CCNC: 49 U/L (ref 10–40)
ANION GAP SERPL CALCULATED.3IONS-SCNC: 8 MMOL/L (ref 4–16)
AST SERPL-CCNC: 32 IU/L (ref 15–37)
BASOPHILS ABSOLUTE: 0 K/CU MM
BASOPHILS RELATIVE PERCENT: 0.7 % (ref 0–1)
BILIRUB SERPL-MCNC: 0.4 MG/DL (ref 0–1)
BILIRUBIN DIRECT: 0.2 MG/DL (ref 0–0.3)
BILIRUBIN, INDIRECT: 0.2 MG/DL (ref 0–0.7)
BUN BLDV-MCNC: 16 MG/DL (ref 6–23)
CALCIUM SERPL-MCNC: 9 MG/DL (ref 8.3–10.6)
CHLORIDE BLD-SCNC: 105 MMOL/L (ref 99–110)
CHOLESTEROL: 120 MG/DL
CO2: 29 MMOL/L (ref 21–32)
CREAT SERPL-MCNC: 1.3 MG/DL (ref 0.9–1.3)
DIFFERENTIAL TYPE: ABNORMAL
EOSINOPHILS ABSOLUTE: 0.1 K/CU MM
EOSINOPHILS RELATIVE PERCENT: 2.4 % (ref 0–3)
ESTIMATED AVERAGE GLUCOSE: 209 MG/DL
GFR AFRICAN AMERICAN: >60 ML/MIN/1.73M2
GFR NON-AFRICAN AMERICAN: 60 ML/MIN/1.73M2
GLUCOSE BLD-MCNC: 108 MG/DL (ref 70–99)
HBA1C MFR BLD: 8.9 % (ref 4.2–6.3)
HCT VFR BLD CALC: 46.4 % (ref 42–52)
HDLC SERPL-MCNC: 30 MG/DL
HEMOGLOBIN: 14.9 GM/DL (ref 13.5–18)
IMMATURE NEUTROPHIL %: 0.7 % (ref 0–0.43)
LDL CHOLESTEROL DIRECT: 71 MG/DL
LYMPHOCYTES ABSOLUTE: 1.8 K/CU MM
LYMPHOCYTES RELATIVE PERCENT: 31.8 % (ref 24–44)
MCH RBC QN AUTO: 30.3 PG (ref 27–31)
MCHC RBC AUTO-ENTMCNC: 32.1 % (ref 32–36)
MCV RBC AUTO: 94.5 FL (ref 78–100)
MONOCYTES ABSOLUTE: 0.6 K/CU MM
MONOCYTES RELATIVE PERCENT: 10.3 % (ref 0–4)
NUCLEATED RBC %: 0 %
PDW BLD-RTO: 13.1 % (ref 11.7–14.9)
PLATELET # BLD: 184 K/CU MM (ref 140–440)
PMV BLD AUTO: 11.1 FL (ref 7.5–11.1)
POTASSIUM SERPL-SCNC: 4.6 MMOL/L (ref 3.5–5.1)
RBC # BLD: 4.91 M/CU MM (ref 4.6–6.2)
SEGMENTED NEUTROPHILS ABSOLUTE COUNT: 3.1 K/CU MM
SEGMENTED NEUTROPHILS RELATIVE PERCENT: 54.1 % (ref 36–66)
SODIUM BLD-SCNC: 142 MMOL/L (ref 135–145)
TOTAL IMMATURE NEUTOROPHIL: 0.04 K/CU MM
TOTAL NUCLEATED RBC: 0 K/CU MM
TOTAL PROTEIN: 6.4 GM/DL (ref 6.4–8.2)
TRIGL SERPL-MCNC: 110 MG/DL
TSH HIGH SENSITIVITY: 1.91 UIU/ML (ref 0.27–4.2)
WBC # BLD: 5.7 K/CU MM (ref 4–10.5)

## 2021-03-15 PROCEDURE — 36415 COLL VENOUS BLD VENIPUNCTURE: CPT

## 2021-03-15 PROCEDURE — 84443 ASSAY THYROID STIM HORMONE: CPT

## 2021-03-15 PROCEDURE — 80061 LIPID PANEL: CPT

## 2021-03-15 PROCEDURE — 85025 COMPLETE CBC W/AUTO DIFF WBC: CPT

## 2021-03-15 PROCEDURE — 83036 HEMOGLOBIN GLYCOSYLATED A1C: CPT

## 2021-03-15 PROCEDURE — 80053 COMPREHEN METABOLIC PANEL: CPT

## 2021-03-15 PROCEDURE — 82248 BILIRUBIN DIRECT: CPT

## 2021-03-15 PROCEDURE — 83721 ASSAY OF BLOOD LIPOPROTEIN: CPT

## 2021-04-05 ENCOUNTER — OFFICE VISIT (OUTPATIENT)
Dept: BARIATRICS/WEIGHT MGMT | Age: 46
End: 2021-04-05

## 2021-04-05 VITALS — WEIGHT: 315 LBS | HEIGHT: 69 IN | BODY MASS INDEX: 46.65 KG/M2

## 2021-04-05 DIAGNOSIS — E66.01 MORBID OBESITY WITH BMI OF 45.0-49.9, ADULT (HCC): Primary | ICD-10-CM

## 2021-04-05 PROCEDURE — 99999 PR OFFICE/OUTPT VISIT,PROCEDURE ONLY: CPT

## 2021-04-06 ENCOUNTER — HOSPITAL ENCOUNTER (OUTPATIENT)
Dept: PHYSICAL THERAPY | Age: 46
Setting detail: THERAPIES SERIES
Discharge: HOME | End: 2021-04-06
Payer: COMMERCIAL

## 2021-04-06 PROCEDURE — 97161 PT EVAL LOW COMPLEX 20 MIN: CPT

## 2021-04-06 PROCEDURE — 97535 SELF CARE MNGMENT TRAINING: CPT

## 2021-04-06 NOTE — PROGRESS NOTES
Physical Therapy  Initial Assessment  Date: 2021  Patient Name: Tee Manning  MRN: [id number]  : 1975     Treatment Diagnosis: bariatric    Restrictions       Subjective   General  Chart Reviewed: Yes  Patient assessed for rehabilitation services?: Yes  Additional Pertinent Hx: COPD, HTN, brayden Sx  Response To Previous Treatment: Not applicable  Referring Practitioner: Veronica Crain  Diagnosis: bariataric  PT Visit Information  PT Insurance Information: [place]  Subjective  Subjective: rides a bike outdoors, 3-4 days/week. no weights currently, has done some PT.  good support from family and a friend who's been thru program, Hx hernia inguinal and abdom w/ multi repairs and may be due again. No ortho issues  Pain Screening  Patient Currently in Pain: No  Vital Signs  Patient Currently in Pain: No    Vision/Hearing  Vision  Vision: Impaired    Orientation  Orientation  Overall Orientation Status: Within Normal Limits    Social/Functional History  Social/Functional History  Lives With: Family  Type of Home: House  Occupation: Full time employment  Type of occupation: travels too  Leisure & Hobbies: cycling    Objective     Observation/Palpation  Observation: obese male    AROM RLE (degrees)  RLE AROM: WNL  AROM LLE (degrees)  LLE AROM : WNL  AROM RUE (degrees)  RUE AROM : WNL  AROM LUE (degrees)  LUE AROM : WNL    Strength RLE  Strength RLE: WNL  Strength LLE  Strength LLE: WNL  Strength RUE  Strength RUE: WNL  Strength LUE  Strength LUE: WNL     Additional Measures  Other: 6 MWT  1313.7 ft no issues     Assessment   Conditions Requiring Skilled Therapeutic Intervention  Body structures, Functions, Activity limitations: Decreased functional mobility (hernia Hx)  Assessment: Pt is a 42 yo male who presents for PT evaluation as part of bariatric program.  He has Hx of abdominal and inguinal hernias both w/ multi repairs. He is currently cycling but nothing else so we discussed multi options this date.   He has no orthopedic barriers to exercise and has done some strength training in the past.  He has good support from family and a friend who had similar procedure a year ago. He is well motivated and seems like a good candidate for the program.  Patient agrees with established plan of care and assisted in the development of their short term and long term goals. Patient had no adverse reaction with initial treatment and there are no barriers to learning. Demonstrates no mental or cognitive disorder.     Treatment Diagnosis: bariatric  Prognosis: Good  Decision Making: Low Complexity  Barriers to Learning: none  REQUIRES PT FOLLOW UP: Yes  Treatment Initiated : exercise education and hand out issued         Plan   Plan  Times per week: 1x  Plan weeks: 2w  Specific instructions for Next Treatment: group bariatric class  Current Treatment Recommendations: Home Exercise Program, Patient/Caregiver Education & Training       OutComes Score     See 6 MWT     Goals  Short term goals  Time Frame for Short term goals: 2 sessions  Short term goal 1: Utilizing group and individual instruction, patient will be educated in physical activity/ exercise concepts including use of basic fitness equipment and developing a personal exercise program  Patient Goals   Patient goals : lose weight to get off meds, CPAP and hopefully decrease need for hernia repairs, learn exercises and gather information for success in this area,          Aleksandra Rai, PT  PT, MPT, ATC     4/6/2021, 11:08 AM

## 2021-04-06 NOTE — PLAN OF CARE
Outpatient Physical Therapy           [place]           [] Phone: [phone]   Fax: [phone]  [place]           [] Phone: [phone]   Fax: [phone]     To: Referring Practitioner: Juan Finney    From: Madelaine Easton PT     Patient: David Freeman       : 1975  Diagnosis: Diagnosis: bariataric   Treatment Diagnosis: Treatment Diagnosis: bariatric   Date: 2021    Physical Therapy Certification/Re-Certification Form  Dear Dr. Juan Finney,   The following patient has been evaluated for physical therapy services and for therapy to continue, insurance requires physician review of the treatment plan initially and every 90 days. Please review the attached evaluation and/or summary of the patient's plan of care, and verify that you agree therapy should continue by signing the attached document and sending it back to our office. Assessment:    Assessment: Pt is a 40 yo male who presents for PT evaluation as part of bariatric program.  He has Hx of abdominal and inguinal hernias both w/ multi repairs. He is currently cycling but nothing else so we discussed multi options this date. He has no orthopedic barriers to exercise and has done some strength training in the past.  He has good support from family and a friend who had similar procedure a year ago. He is well motivated and seems like a good candidate for the program.  Patient agrees with established plan of care and assisted in the development of their short term and long term goals. Patient had no adverse reaction with initial treatment and there are no barriers to learning. Demonstrates no mental or cognitive disorder.       Plan of Care/Treatment to date:  [x] Therapeutic Exercise  [] Modalities:  [] Therapeutic Activity     [] Ultrasound  [] Electrical Stimulation  [] Gait Training      [] Cervical Traction [] Lumbar Traction  [] Neuromuscular Re-education    [] Cold/hotpack [] Iontophoresis   [x] Instruction in HEP      []

## 2021-04-14 ENCOUNTER — OFFICE VISIT (OUTPATIENT)
Dept: BARIATRICS/WEIGHT MGMT | Age: 46
End: 2021-04-14
Payer: COMMERCIAL

## 2021-04-14 VITALS
HEART RATE: 61 BPM | HEIGHT: 70 IN | WEIGHT: 315 LBS | SYSTOLIC BLOOD PRESSURE: 124 MMHG | DIASTOLIC BLOOD PRESSURE: 86 MMHG | TEMPERATURE: 97 F | BODY MASS INDEX: 45.1 KG/M2 | OXYGEN SATURATION: 98 %

## 2021-04-14 DIAGNOSIS — E66.9 DIABETES MELLITUS TYPE 2 IN OBESE (HCC): Primary | ICD-10-CM

## 2021-04-14 DIAGNOSIS — E66.01 MORBID OBESITY WITH BMI OF 45.0-49.9, ADULT (HCC): ICD-10-CM

## 2021-04-14 DIAGNOSIS — Z01.818 PRE-OP EVALUATION: ICD-10-CM

## 2021-04-14 DIAGNOSIS — E11.69 DIABETES MELLITUS TYPE 2 IN OBESE (HCC): Primary | ICD-10-CM

## 2021-04-14 DIAGNOSIS — I10 ESSENTIAL HYPERTENSION: ICD-10-CM

## 2021-04-14 PROCEDURE — 3052F HG A1C>EQUAL 8.0%<EQUAL 9.0%: CPT | Performed by: NURSE PRACTITIONER

## 2021-04-14 PROCEDURE — 99214 OFFICE O/P EST MOD 30 MIN: CPT | Performed by: NURSE PRACTITIONER

## 2021-04-14 ASSESSMENT — ENCOUNTER SYMPTOMS
ABDOMINAL PAIN: 0
WHEEZING: 0
RHINORRHEA: 0
SHORTNESS OF BREATH: 0
DIARRHEA: 0
EYE PAIN: 0
CHEST TIGHTNESS: 0
ABDOMINAL DISTENTION: 0
NAUSEA: 0
TROUBLE SWALLOWING: 0

## 2021-04-16 ENCOUNTER — PROCEDURE VISIT (OUTPATIENT)
Dept: PULMONOLOGY | Age: 46
End: 2021-04-16
Payer: COMMERCIAL

## 2021-04-16 ENCOUNTER — HOSPITAL ENCOUNTER (OUTPATIENT)
Age: 46
Discharge: HOME | End: 2021-04-16
Payer: COMMERCIAL

## 2021-04-16 ENCOUNTER — HOSPITAL ENCOUNTER (OUTPATIENT)
Dept: GENERAL RADIOLOGY | Age: 46
Discharge: HOME | End: 2021-04-16
Payer: COMMERCIAL

## 2021-04-16 VITALS
HEART RATE: 62 BPM | OXYGEN SATURATION: 97 % | HEIGHT: 70 IN | SYSTOLIC BLOOD PRESSURE: 126 MMHG | WEIGHT: 315 LBS | BODY MASS INDEX: 45.1 KG/M2 | DIASTOLIC BLOOD PRESSURE: 74 MMHG

## 2021-04-16 DIAGNOSIS — Z01.811 PREOP PULMONARY/RESPIRATORY EXAM: Primary | ICD-10-CM

## 2021-04-16 DIAGNOSIS — G47.33 OSA (OBSTRUCTIVE SLEEP APNEA): ICD-10-CM

## 2021-04-16 DIAGNOSIS — E66.01 MORBID OBESITY (HCC): ICD-10-CM

## 2021-04-16 DIAGNOSIS — R06.02 SOB (SHORTNESS OF BREATH) ON EXERTION: ICD-10-CM

## 2021-04-16 DIAGNOSIS — Z01.811 PREOP PULMONARY/RESPIRATORY EXAM: ICD-10-CM

## 2021-04-16 PROCEDURE — 71046 X-RAY EXAM CHEST 2 VIEWS: CPT

## 2021-04-16 PROCEDURE — 99203 OFFICE O/P NEW LOW 30 MIN: CPT | Performed by: INTERNAL MEDICINE

## 2021-04-16 NOTE — PROGRESS NOTES
longer on topamax. Current Medications:    No current facility-administered medications for this visit. No Known Allergies    Social History:    Social History     Socioeconomic History    Marital status:      Spouse name: None    Number of children: None    Years of education: None    Highest education level: None   Occupational History    None   Social Needs    Financial resource strain: None    Food insecurity     Worry: None     Inability: None    Transportation needs     Medical: None     Non-medical: None   Tobacco Use    Smoking status: Never Smoker    Smokeless tobacco: Never Used   Substance and Sexual Activity    Alcohol use: No     Comment:         CAFFEINE: 48 oz [employer] daily.     Drug use: No    Sexual activity: None   Lifestyle    Physical activity     Days per week: None     Minutes per session: None    Stress: None   Relationships    Social connections     Talks on phone: None     Gets together: None     Attends [employer] service: None     Active member of club or organization: None     Attends meetings of clubs or organizations: None     Relationship status: None    Intimate partner violence     Fear of current or ex partner: None     Emotionally abused: None     Physically abused: None     Forced sexual activity: None   Other Topics Concern    None   Social History Narrative    None       Family History:    Family History   Problem Relation Age of Onset    Other Mother         Factor Five    Cancer Maternal Grandmother         leukemia and Gallbladder CA    Cancer Paternal Grandmother         GI    Heart Disease Paternal Grandfather          REVIEW OF SYSTEMS:    CONSTITUTIONAL:  negative for fevers, chills, diaphoresis, activity change,night sweats   HEENT:  negative for hearing loss,  sinus pressure, nasal congestion, epistaxis   RESPIRATORY:  See HPI  CARDIOVASCULAR:  negative for chest pain, palpitations, exertional chest pressure/discomfort, edema, syncope  GASTROINTESTINAL: negative for nausea, vomiting, diarrhea, constipation, blood in stool and abdominal pain  GENITOURINARY:  negative for frequency, dysuria and hematuria  HEMATOLOGIC/LYMPHATIC:  negative for easy bruising, bleeding and lymphadenopathy  ALLERGIC/IMMUNOLOGIC:  negative for recurrent infections, angioedema, anaphylaxis and drug reaction  MUSCULOSKELETAL:  negative for  pain, joint swelling, decreased range of motion and muscle weakness  NEURO: negative for chronic headaches,seizures,TIA,CVA  SKIN: negative for rash,pruritis    Objective:   PHYSICAL EXAM:      VITALS:    Vitals:    04/16/21 1023   BP: 126/74   Pulse: 62   SpO2: 97%   Weight: (!) 320 lb (145.2 kg)   Height: 5' 10\" (1.778 m)         CONSTITUTIONAL:  awake, alert, cooperative, no apparent distress, and appears stated age, morbidly obese, in no respiratory distress  HEENT:  supple and nontender,  trachea midline, no adenopathy, thyroid nl, no JVD, no wheezing or stridor over neck  CHEST: chest expansion equal and symmetrical, no intercostal retraction, no increased work of breathing  LUNGS: Breath sounds are clear throughout all areas without wheezing or rhonchi  CARDIOVASCULAR: normal S1 and S2 , no murmurs or gallops ,no pericardial rubs  ABDOMEN:  normal bowel sounds, non-distended and no masses palpated, and no tenderness to palpation. No hepatosplenomegaly  LYMPHADENOPATHY:  no axillary or supraclavicular adenopathy. No cervical adenopathy  EXTREMITIES: no edema, no inflammation, no tenderness. NEURO: oriented X 3, No focal deficits  SKIN: no rashes, No lesions    RADIOLOGY: Chest x-ray 6/30/2020  Stable mild cardiomegaly.       Mild pulmonary vascular congestion.       Low lung volumes. PFT: Office spirometry demonstrates an FEV1 of 2.94 L with an FVC of 3.51 L. There is no significant airways obstruction. There is no significant response to bronchodilators.   Compared to pulmonary function test done on 2/11/2019 with an FEV1 of 3.02 L there has not been a significant change over the past 2 years    Assessment:     1. Preop pulmonary/respiratory exam    2. Morbid obesity ([address]    3. SOFIA (obstructive sleep apnea)    4. SOB (shortness of breath) on exertion        Plan:   He has full pulmonary clearance for bariatric surgery and general anesthesia. I did advise him to bring his BiPAP with him to the hospital in case he has an overnight stay. I also requested a follow-up chest x-ray to make sure he cleared his previous mild congestive changes. As he loses weight he may need changes in his BiPAP settings and reevaluation of his obstructive sleep apnea. We have discussed the need to maintain yearly flu immunization, pneumococcal vaccination. We have discussed Coronavirus precaution including handwashing practice, wiping items touched in public such as gas pumps, door handles, shopping carts, etc. Self monitoring for infection - fever, chills, cough, SOB. Should they develop symptoms they should call office for further instructions. Return in about 6 months (around 10/16/2021) for Recheck for Shortness of Breath, Recheck for Obstructive Sleep Apnea, Pre-op evaluation. This dictation was performed with a verbal recognition program and it was checked for errors. It is possible that there are still dictated errors within this office note. Any errors should be brought immediately to my attention for correction. All efforts were made to ensure that this office note is accurate.

## 2021-04-28 ENCOUNTER — HOSPITAL ENCOUNTER (OUTPATIENT)
Dept: PHYSICAL THERAPY | Age: 46
Setting detail: THERAPIES SERIES
Discharge: HOME | End: 2021-04-28
Payer: COMMERCIAL

## 2021-04-28 PROCEDURE — 97150 GROUP THERAPEUTIC PROCEDURES: CPT

## 2021-04-28 NOTE — DISCHARGE SUMMARY
Outpatient Physical Therapy                                                                    [place]           [x] Phone: [phone]   Fax: [phone]  [place]           [] Phone: [phone]   Fax: [phone]          Physical Therapy Daily Treatment Note  Date:  2021    Patient Name:  Erum Matamoros    :  1975  MRN: [id number]  Restrictions/Precautions:    Pain level: 0/10     Erum Matamoros  was seen today for the physical activity education presentation. Patient was screened for Covid symptoms and their temperature was taken. Patient does not have symptoms warranting delaying services. Will proceed with today's session. The purpose of the group exercises presentation was to present information to the individual for fitness and wellness. Part one of the presentation we spoke about the reasons why exercises and wellness is beneficial. We presented data and examples of different symptoms in the body improve as well improvement with sleep, anxiety, depression weight loss and improve in lung function. Part 2- Setting goals was described. SMART goals and formation of short term and long term goals to help be successful. Patient was referred back to their packet for example worksheet. Part 3- Examples of how to get started. Planning your exercises routine to be successful. Remember to be start slow and gradually increase in the intensity at home. Drinking plenty of fluid and always perform a warm up or cool down. Part 4- Exercises Basics - demonstrated the importance of a 5-10 min warm up with light stretches. Cool down to allow the body to return to base line measure. They demonstrated how to take their heart rate and why it is important to know the target range zone for fitness. Explained the Do's and Don'ts and the PRICE principal if injured. Explained they are to seek medical advice if severe pain, numbness, swelling or radicular symptoms occur.     Patient

## 2021-05-12 ENCOUNTER — OFFICE VISIT (OUTPATIENT)
Dept: BARIATRICS/WEIGHT MGMT | Age: 46
End: 2021-05-12
Payer: COMMERCIAL

## 2021-05-12 VITALS
SYSTOLIC BLOOD PRESSURE: 120 MMHG | DIASTOLIC BLOOD PRESSURE: 80 MMHG | OXYGEN SATURATION: 94 % | BODY MASS INDEX: 45.1 KG/M2 | HEIGHT: 70 IN | WEIGHT: 315 LBS | HEART RATE: 76 BPM

## 2021-05-12 DIAGNOSIS — E66.01 MORBID OBESITY WITH BMI OF 45.0-49.9, ADULT (HCC): Primary | ICD-10-CM

## 2021-05-12 PROCEDURE — 99213 OFFICE O/P EST LOW 20 MIN: CPT | Performed by: SURGERY

## 2021-05-12 ASSESSMENT — ENCOUNTER SYMPTOMS
COUGH: 0
COLOR CHANGE: 0
DIARRHEA: 0
VOMITING: 0
ANAL BLEEDING: 0
SHORTNESS OF BREATH: 0
VOICE CHANGE: 0
BLOOD IN STOOL: 0
ABDOMINAL PAIN: 0
NAUSEA: 0
TROUBLE SWALLOWING: 0
WHEEZING: 0
CONSTIPATION: 0
SORE THROAT: 0
PHOTOPHOBIA: 0

## 2021-05-12 NOTE — PROGRESS NOTES
BARIATRIC SURGERY OFFICE NOTE    SUBJECTIVE:    Patient presenting today originally referred from Tip Escalante MD, for   Chief Complaint   Patient presents with   [facility]     3 surg wm visit    . HPI: Edson Lovett is a 41 y.o. male being seen for follow up for bariatric weight loss surgery. Corbin'slast month weight gain/loss was -11.9 Lbs. Addressed the status of the following co-morbidities:   1. Cardiology workup great,  Pending report. .  2. Pulm clearance  Gotten pending report. .  3. Upper endoscopy Pending, consented today. Thoroughly reviewed the patient's medical history, family history, social history and review of systems with the patient today in theoffice. Please see medical record for pertinent positives. Past Medical History:   Diagnosis Date    COPD (chronic obstructive pulmonary disease) (HCC)     see Dr Jamie Barrios GERD (gastroesophageal reflux disease)     before gallbladder removed now it's ok    Hypertension     \"had since 2005- see Dr Ariel Elena\"they did a stress and echo after I came with chest pain 7/2014- not my heart the tests all clear- it was the gallbladder\"    Kidney stone     \"have a kidney stone now- have passed 2 or 3 in the past\"    PONV (postoperative nausea and vomiting)     PONV    Preop pulmonary/respiratory exam 4/16/2021    Short of breath on exertion     Sleep apnea     has sleep study 2014- use a c-pap machine for the past 8 weeks    Vertigo     \"noticed since I have been on the Topomax for weight loss\" No longer on topamax.       Past Surgical History:   Procedure Laterality Date    CARPAL TUNNEL RELEASE Right 2012    CHOLECYSTECTOMY  9/9/14    CYSTOSCOPY Right 3/4/2019    CYSTOSCOPY RETROGRADE PYLEOGRAMS STONE MANIPULATION, with stent insertion performed by Monse Johnston MD at [address] Right 2001    \"Swollen lymph node\"    HAND SURGERY Right 1999    Thumb reconstruction    HERNIA REPAIR Right last one 2013    inguinal x 5    TONSILLECTOMY AND ADENOIDECTOMY   age of 9 yrs    UMBILICAL HERNIA REPAIR  3/1/25    UMBILICAL HERNIA REPAIR  10/06/15    laparoscopic     Current Outpatient Medications   Medication Sig Dispense Refill    metoprolol tartrate (LOPRESSOR) 50 MG tablet Take 50 mg by mouth 2 times daily      dexlansoprazole (DEXILANT) 60 MG CPDR delayed release capsule Take 60 mg by mouth daily      amLODIPine-olmesartan (FINESSE) 5-20 MG per tablet Take 1 tablet by mouth daily      PROVENTIL  (90 BASE) MCG/ACT inhaler Inhale 2 puffs into the lungs every 6 hours as needed for Wheezing. (Patient not taking: Reported on 5/12/2021) 1 Inhaler 3     No current facility-administered medications for this visit. No Known Allergies        Review of Systems   Constitutional: Negative for activity change, chills, diaphoresis and fever. HENT: Negative for sore throat, trouble swallowing and voice change. Eyes: Negative for photophobia and visual disturbance. Respiratory: Negative for cough, shortness of breath and wheezing. Cardiovascular: Negative for chest pain, palpitations and leg swelling. Gastrointestinal: Negative for abdominal pain, anal bleeding, blood in stool, constipation, diarrhea, nausea and vomiting. Endocrine: Negative for cold intolerance, heat intolerance, polydipsia and polyuria. Genitourinary: Negative for dysuria, frequency and hematuria. Musculoskeletal: Negative for joint swelling, myalgias and neck stiffness. Skin: Negative for color change and rash. Neurological: Negative for seizures, speech difficulty, light-headedness and numbness. Hematological: Negative for adenopathy. Does not bruise/bleed easily. OBJECTIVE:    Vitals:    05/12/21 1310   BP: 120/80   Pulse: 76   SpO2: 94%     Body mass index is 45.71 kg/m². Physical Exam  Vitals signs reviewed. Constitutional:       General: He is not in acute distress.      Appearance: He is well-developed. He is not diaphoretic. HENT:      Head: Normocephalic and atraumatic. Eyes:      General: No scleral icterus. Conjunctiva/sclera: Conjunctivae normal.      Pupils: Pupils are equal, round, and reactive to light. Neck:      Musculoskeletal: Normal range of motion. Thyroid: No thyromegaly. Vascular: No JVD. Trachea: No tracheal deviation. Cardiovascular:      Rate and Rhythm: Normal rate and regular rhythm. Heart sounds: Normal heart sounds. No murmur. No friction rub. No gallop. Pulmonary:      Effort: Pulmonary effort is normal. No respiratory distress. Breath sounds: No stridor. No wheezing or rales. Chest:      Chest wall: No tenderness. Abdominal:      General: Bowel sounds are normal. There is no distension. Palpations: Abdomen is soft. There is no mass. Tenderness: There is no abdominal tenderness. There is no guarding or rebound. Musculoskeletal: Normal range of motion. General: No tenderness. Lymphadenopathy:      Cervical: No cervical adenopathy. Skin:     General: Skin is warm and dry. Coloration: Skin is not pale. Findings: No erythema or rash. Neurological:      Mental Status: He is alert and oriented to person, place, and time. Cranial Nerves: No cranial nerve deficit. Coordination: Coordination normal.   Psychiatric:         Behavior: Behavior normal.         Thought Content: Thought content normal.         Judgment: Judgment normal.                 ASSESSMENT & PLAN:    1. Morbid obesity with BMI of 45.0-49.9, [facility]           Needs EGD, will do ASAP. Patient was encouraged to journal all food intake. Keep calorie level atapproximately 9780-8699. Protein intake is to be a minimum of 60-80 grams per day. Water drinking was encouraged with a goal of 64oz-128oz daily. Beverages to be calorie free except for milk. Every other beverage should bewater. They are to avoid soda.    Continue to increase level of physical activity. I counseled the patient regarding diet and exercise, in preparation for his planned Robotic Sleeve Gastrectomy. Counting calories, complying with the dietitian's recommendations, and complying with the preoperative workup including the dietitian counseling, exercise physiologist counseling,cardiologist evaluation and pre-operative optimization, pulmonologist evaluation and pre operative optimization, pre operative EGD evaluation. The patient expressed understanding and willingness to comply nicely; allquestions and concerns addressed in details. Patient counseled on the risks, benefits, and alternatives oftreatment plan at length while in the office today. Patient states an understanding and willingness to proceed with the plan. Follow Up:  Return in about 2 weeks (around 5/26/2021), or EGD.       Alden Renee MD, FACS, FICS  Member of the [employer] of Met[employer] and Bariatric Surgeons    [phone]    5/12/21

## 2021-05-16 PROBLEM — Z01.811 PREOP PULMONARY/RESPIRATORY EXAM: Status: RESOLVED | Noted: 2021-04-16 | Resolved: 2021-05-16

## 2021-05-18 ENCOUNTER — TELEPHONE (OUTPATIENT)
Dept: BARIATRICS/WEIGHT MGMT | Age: 46
End: 2021-05-18

## 2021-05-18 NOTE — TELEPHONE ENCOUNTER
SPOKE TO / LEFT MESSAGE FOR Salvatore Mata REGARDING SURGERY (UPPER ENDO EGD ) SCHEDULED @ [facility].  NOTIFIED OF DATES, TIMES AND LOCATION    PHONE ASSESSMENT /PAT -  COVID - 6/1/21 10:45  SURGERY -6/8/21 11:15  P/O - 6/16/21 @669    NPO AFTER MIDNIGHT

## 2021-05-25 ENCOUNTER — OFFICE VISIT (OUTPATIENT)
Dept: PULMONOLOGY | Age: 46
End: 2021-05-25
Payer: COMMERCIAL

## 2021-05-25 VITALS
WEIGHT: 315 LBS | TEMPERATURE: 97.3 F | OXYGEN SATURATION: 97 % | HEART RATE: 74 BPM | BODY MASS INDEX: 45.1 KG/M2 | HEIGHT: 70 IN

## 2021-05-25 DIAGNOSIS — G47.10 EXCESSIVE SLEEPINESS: ICD-10-CM

## 2021-05-25 DIAGNOSIS — E66.01 MORBID OBESITY WITH BMI OF 45.0-49.9, ADULT (HCC): ICD-10-CM

## 2021-05-25 DIAGNOSIS — G47.33 OSA (OBSTRUCTIVE SLEEP APNEA): ICD-10-CM

## 2021-05-25 DIAGNOSIS — Z01.818 PREOPERATIVE CLEARANCE: ICD-10-CM

## 2021-05-25 PROCEDURE — 99243 OFF/OP CNSLTJ NEW/EST LOW 30: CPT | Performed by: INTERNAL MEDICINE

## 2021-05-25 RX ORDER — LANSOPRAZOLE 30 MG/1
1 CAPSULE, DELAYED RELEASE ORAL DAILY
COMMUNITY
Start: 2021-04-19

## 2021-05-25 RX ORDER — AMLODIPINE AND VALSARTAN 5; 320 MG/1; MG/1
1 TABLET ORAL DAILY
COMMUNITY
Start: 2021-03-23

## 2021-05-25 RX ORDER — DAPAGLIFLOZIN AND METFORMIN HYDROCHLORIDE 5; 500 MG/1; MG/1
1 TABLET, FILM COATED, EXTENDED RELEASE ORAL DAILY
COMMUNITY
Start: 2021-05-13 | End: 2021-06-03

## 2021-05-25 ASSESSMENT — ENCOUNTER SYMPTOMS
COUGH: 0
EYE DISCHARGE: 0
SHORTNESS OF BREATH: 0
ABDOMINAL PAIN: 0
EYE ITCHING: 0
BACK PAIN: 0
ABDOMINAL DISTENTION: 0

## 2021-05-25 NOTE — PROGRESS NOTES
Erum Matamoros  1975  Referring Provider: Dr. Donovan Ott    Subjective:     Chief Complaint   Patient presents with    Follow-up    Sleep Apnea       HPI  Juan Luis Roche is a 41 y.o. male has come back as a follow up. He has SOFIA on a BIPAP 19/15 cm h20 which he is using it every night about 6 to 7 hours. He says that it is helping him. He lost about 23 lbs since his last visit. He would to keep this pressure for now. He has a FFM. He is not sleepy or tired during the day time. His 2 week download data showed that his residual AHI is 1.0 and leak is for 4 mins 23 secs. Current Outpatient Medications   Medication Sig Dispense Refill    lansoprazole (PREVACID) 30 MG delayed release capsule Take 1 capsule by mouth daily      amLODIPine-valsartan (EXFORGE) 5-320 MG per tablet Take 1 tablet by mouth daily      metoprolol tartrate (LOPRESSOR) 50 MG tablet Take 50 mg by mouth 2 times daily      PROVENTIL  (90 BASE) MCG/ACT inhaler Inhale 2 puffs into the lungs every 6 hours as needed for Wheezing. 1 Inhaler 3    predniSONE (DELTASONE) 1 MG tablet Take 1 mg by mouth daily       No current facility-administered medications for this visit.        No Known Allergies    Past Medical History:   Diagnosis Date    COPD (chronic obstructive pulmonary disease) [facility])     see Dr Levi Nugent GERD (gastroesophageal reflux disease)     before gallbladder removed now it's ok    Hypertension     \"had since 2005- see Dr Yvonne Elena\"they did a stress and echo after I came with chest pain 7/2014- not my heart the tests all clear- it was the gallbladder\"    Kidney stone     \"have a kidney stone now- have passed 2 or 3 in the past\"    PONV (postoperative nausea and vomiting)     PONV    Preop pulmonary/respiratory exam 4/16/2021    Short of breath on exertion     Sleep apnea     has sleep study 2014- use a c-pap machine for the past 8 weeks    Vertigo     \"noticed since I have been on the Topomax for weight loss\" No Confucianism Services:     Active Member of Clubs or Organizations:     Attends Club or Organization Meetings:     Marital Status:    Intimate Partner Violence:     Fear of Current or Ex-Partner:     Emotionally Abused:     Physically Abused:     Sexually Abused:        Review of Systems   Constitutional: Negative for fatigue. HENT: Negative for congestion and postnasal drip. Eyes: Negative for discharge and itching. Respiratory: Negative for cough and shortness of breath. Cardiovascular: Negative for chest pain and leg swelling. Gastrointestinal: Negative for abdominal distention and abdominal pain. Endocrine: Negative for cold intolerance and heat intolerance. Genitourinary: Negative for enuresis and flank pain. Musculoskeletal: Negative for arthralgias and back pain. Allergic/Immunologic: Negative for environmental allergies and food allergies. Neurological: Negative for light-headedness and headaches. Hematological: Negative for adenopathy. Psychiatric/Behavioral: Negative for agitation and behavioral problems. Objective:   Pulse 74   Temp 97.3 °F (36.3 °C) (Temporal)   Ht 5' 10\" (1.778 m)   Wt (!) 318 lb (144.2 kg)   SpO2 97%   BMI 45.63 kg/m²   Body mass index is 45.63 kg/m². Sleep Medicine 1/29/2019 1/2/2019   Sitting and reading 1 2   Watching TV 1 2   Sitting, inactive in a public place (e.g. a theatre or a meeting) 0 1   As a passenger in a car for an hour without a break 0 1   Lying down to rest in the afternoon when circumstances permit 1 1   Sitting and talking to someone 0 0   Sitting quietly after a lunch without alcohol 0 0   In a car, while stopped for a few minutes in traffic 0 0   Total score 3 7   Neck circumference - 19     {MALLAMPATI:3    Physical Exam  Vitals reviewed. Constitutional:       Appearance: Normal appearance. Comments: Morbid obesity   HENT:      Head: Normocephalic and atraumatic.       Nose: Nose normal.      Mouth/Throat: Mouth: Mucous membranes are moist.   Eyes:      Extraocular Movements: Extraocular movements intact. Pupils: Pupils are equal, round, and reactive to light. Cardiovascular:      Rate and Rhythm: Normal rate and regular rhythm. Pulses: Normal pulses. Heart sounds: Normal heart sounds. Pulmonary:      Effort: Pulmonary effort is normal.      Breath sounds: Normal breath sounds. Abdominal:      General: Abdomen is flat. Palpations: Abdomen is soft. Musculoskeletal:         General: Normal range of motion. Cervical back: Normal range of motion and neck supple. Skin:     General: Skin is warm and dry. Neurological:      General: No focal deficit present. Mental Status: He is alert and oriented to person, place, and time. Psychiatric:         Mood and Affect: Mood normal.         Behavior: Behavior normal.         Radiology: None    Assessment and Plan     Problem List        Pulmonary Problems    SOFIA (obstructive sleep apnea)     Advised to be compliant with the BIPAP  Loose weight            Other    Excessive sleepiness     Advised to be compliant with the BIPAP  Loose weight         Morbid obesity with BMI of 45.0-49.9, adult (HCC)     Advised to loose weight with diet and exercise           Preoperative clearance      Patient is cleared for the Bariatric Surgery  Incentive Spirometry pre and post op  Advised to be compliant with the BIPAP  Loose weight                    Return in about 1 year (around 5/25/2022) for 2 week download data.      Progress notes sent to the referring Provider    Sasha Parra MD MD  8/16/2021  12:33 PM

## 2021-05-28 DIAGNOSIS — Z01.818 PRE-OP TESTING: Primary | ICD-10-CM

## 2021-06-01 ENCOUNTER — HOSPITAL ENCOUNTER (OUTPATIENT)
Age: 46
Setting detail: SPECIMEN
Discharge: HOME | End: 2021-06-01
Payer: COMMERCIAL

## 2021-06-01 ENCOUNTER — NURSE ONLY (OUTPATIENT)
Dept: SURGERY | Age: 46
End: 2021-06-01
Payer: COMMERCIAL

## 2021-06-01 VITALS
SYSTOLIC BLOOD PRESSURE: 122 MMHG | TEMPERATURE: 97.5 F | OXYGEN SATURATION: 97 % | DIASTOLIC BLOOD PRESSURE: 82 MMHG | HEART RATE: 69 BPM

## 2021-06-01 DIAGNOSIS — Z01.818 PRE-OP TESTING: Primary | ICD-10-CM

## 2021-06-01 PROCEDURE — U0003 INFECTIOUS AGENT DETECTION BY NUCLEIC ACID (DNA OR RNA); SEVERE ACUTE RESPIRATORY SYNDROME CORONAVIRUS 2 (SARS-COV-2) (CORONAVIRUS DISEASE [COVID-19]), AMPLIFIED PROBE TECHNIQUE, MAKING USE OF HIGH THROUGHPUT TECHNOLOGIES AS DESCRIBED BY CMS-2020-01-R: HCPCS

## 2021-06-01 PROCEDURE — U0005 INFEC AGEN DETEC AMPLI PROBE: HCPCS

## 2021-06-01 PROCEDURE — 99211 OFF/OP EST MAY X REQ PHY/QHP: CPT | Performed by: SURGERY

## 2021-06-01 NOTE — PATIENT INSTRUCTIONS
Pre-Procedure COVID-19 Self Testing  Quarantine Instructions  Day of Surgery Instructions         What to do before my surgery:    All patients scheduled for elective surgery must test for COVID19 72-96 hours prior to the surgery date.  Pre-Procedure COVID-19 Self-Test will be scheduled for you by your provider.  You can receive your Pre-Procedure COVID-19 Self-Test at:  [facility]. [address] [facility], [facility], [facility], [address]   If you do not have the COVID-19 test we will cancel or reschedule your procedure   Once you test you must quarantine at home until after your procedure with only your immediate family members or whoever lives with you.  If you must work during your quarantine period, we ask that you continue to practice social distancing, wear a mask that covers your mouth and nose and perform all hand hygiene as recommended by the CDC.  If you must go to the grocery, etc. and cannot get someone to do this for you please wear a mask that covers your mouth and nose and perform all hand hygiene as recommended by the CDC.  Your surgeon's office will notify you with any concerns about your test result. What can I expect on the day of surgery?  Arrive at the time the office or hospital staff tell you on the day of your procedure.  Wear a mask when entering the hospital.     A member of the hospital staff will take your temperature and ask you a few questions as you enter the building.  In abundance of caution for the safety of all our patients and staff, please follow all hospital visitor guidelines in place at the time of your procedure. The staff caring for you will stay in close communication with your loved one and keep them updated on progress.  Please provide a phone number for us to use when communicating with your family or ride home.    When you are ready to discharge, we will notify your family/person with you to bring the car to the front entrance. We will take you to them after you receive all of your discharge instructions.

## 2021-06-01 NOTE — PROGRESS NOTES
Patient collected pre-op COVID-19 screening instruction and collection supplies given to patient accordingly. Patient denies fever/cough/sob or recent travels. Patient voiced understanding of collection/quarantine instructions. COVID screening lab ordered, collection completed without difficulty, identifiers placed on specimen. AVS given at discharge. Results will be given via mychart or telephone call [facility] Dept will manage any positive test results, the procedure will be rescheduled at a later date).

## 2021-06-03 LAB
SARS-COV-2: NOT DETECTED
SOURCE: NORMAL

## 2021-06-05 ENCOUNTER — ANESTHESIA EVENT (OUTPATIENT)
Dept: ENDOSCOPY | Age: 46
End: 2021-06-05
Payer: COMMERCIAL

## 2021-06-05 NOTE — ANESTHESIA PRE PROCEDURE
Department of Anesthesiology  Preprocedure Note       Name:  Rosalee Stratton   Age:  41 y.o.  :  1975                                          MRN:  [id number]         Date:  2021      Surgeon: Rigoberto Abel):  Christina Causey MD    Procedure: Procedure(s):  EGD BIOPSY    Medications prior to admission:   Prior to Admission medications    Medication Sig Start Date End Date Taking? Authorizing Provider   lansoprazole (PREVACID) 30 MG delayed release capsule Take 1 capsule by mouth daily 21  Yes Historical Provider, MD   amLODIPine-valsartan (EXFORGE) 5-320 MG per tablet Take 1 tablet by mouth daily 3/23/21  Yes Historical Provider, MD   metoprolol tartrate (LOPRESSOR) 50 MG tablet Take 50 mg by mouth 2 times daily   Yes Historical Provider, MD   PROVENTIL  (90 BASE) MCG/ACT inhaler Inhale 2 puffs into the lungs every 6 hours as needed for Wheezing. 14   Syed Cool MD       Current medications:    No current facility-administered medications for this encounter. Current Outpatient Medications   Medication Sig Dispense Refill    lansoprazole (PREVACID) 30 MG delayed release capsule Take 1 capsule by mouth daily      amLODIPine-valsartan (EXFORGE) 5-320 MG per tablet Take 1 tablet by mouth daily      metoprolol tartrate (LOPRESSOR) 50 MG tablet Take 50 mg by mouth 2 times daily      PROVENTIL  (90 BASE) MCG/ACT inhaler Inhale 2 puffs into the lungs every 6 hours as needed for Wheezing.  1 Inhaler 3       Allergies:  No Known Allergies    Problem List:    Patient Active Problem List   Diagnosis Code    SOB (shortness of breath) on exertion R06.02    Snoring R06.83    Excessive sleepiness M09.08    Umbilical hernia without obstruction and without gangrene K42.9    SOFIA (obstructive sleep apnea) G47.33    Morbid obesity ([facility]) E66.01    Ureteral stone N20.1    Morbid obesity with BMI of 45.0-49.9, adult ([facility]) E66.01, Z68.42       Past Medical History: Diagnosis Date    COPD (chronic obstructive pulmonary disease) ([facility])     see Dr Sarmad Mcmullen GERD (gastroesophageal reflux disease)     before gallbladder removed now it's ok    Hypertension     \"had since 2005- see Dr Martin Elena\"they did a stress and echo after I came with chest pain 7/2014- not my heart the tests all clear- it was the gallbladder\"    Kidney stone     \"have a kidney stone now- have passed 2 or 3 in the past\"    PONV (postoperative nausea and vomiting)     PONV    Preop pulmonary/respiratory exam 4/16/2021    Short of breath on exertion     Sleep apnea     has sleep study 2014- use a c-pap machine for the past 8 weeks    Vertigo     \"noticed since I have been on the Topomax for weight loss\" No longer on topamax. Past Surgical History:        Procedure Laterality Date    CARPAL TUNNEL RELEASE Right 2012    CHOLECYSTECTOMY  9/9/14    COLONOSCOPY      CYSTOSCOPY Right 3/4/2019    CYSTOSCOPY RETROGRADE PYLEOGRAMS STONE MANIPULATION, with stent insertion performed by Judy Clayton MD at [address], COLON, DIAGNOSTIC      EXTERNAL EAR SURGERY Right 2001    \"Swollen lymph node\"    HAND SURGERY Right 1999    Thumb reconstruction    HERNIA REPAIR Right last one 2013    inguinal x 5    TONSILLECTOMY AND ADENOIDECTOMY   age of 9 yrs    UMBILICAL HERNIA REPAIR  9/4/55    UMBILICAL HERNIA REPAIR  10/06/15    laparoscopic       Social History:    Social History     Tobacco Use    Smoking status: Never Smoker    Smokeless tobacco: Never Used   Substance Use Topics    Alcohol use: No     Comment:         CAFFEINE: 48 oz [employer] daily.                                 Counseling given: Not Answered      Vital Signs (Current):   Vitals:    06/03/21 1414 06/04/21 1204   Weight: (!) 307 lb (139.3 kg) (!) 307 lb (139.3 kg)   Height: 5' 10\" (1.778 m) 5' 10\" (1.778 m)                                              BP Readings from Last 3 Encounters:   06/01/21 122/82   05/12/21 120/80   04/16/21 126/74       NPO Status:                                      12 hrs. BMI:   Wt Readings from Last 3 Encounters:   05/25/21 (!) 318 lb (144.2 kg)   05/12/21 (!) 318 lb 9.6 oz (144.5 kg)   04/16/21 (!) 320 lb (145.2 kg)     Body mass index is 44.05 kg/m². CBC:   Lab Results   Component Value Date    WBC 5.7 03/15/2021    RBC 4.91 03/15/2021    HGB 14.9 03/15/2021    HCT 46.4 03/15/2021    MCV 94.5 03/15/2021    RDW 13.1 03/15/2021     03/15/2021       CMP:   Lab Results   Component Value Date     03/15/2021    K 4.6 03/15/2021     03/15/2021    CO2 29 03/15/2021    BUN 16 03/15/2021    CREATININE 1.3 03/15/2021    GFRAA >60 03/15/2021    LABGLOM 60 03/15/2021    GLUCOSE 108 03/15/2021    PROT 6.4 03/15/2021    PROT 7.1 08/28/2011    CALCIUM 9.0 03/15/2021    BILITOT 0.4 03/15/2021    ALKPHOS 71 03/15/2021    AST 32 03/15/2021    ALT 49 03/15/2021       POC Tests: No results for input(s): POCGLU, POCNA, POCK, POCCL, POCBUN, POCHEMO, POCHCT in the last 72 hours. Coags: No results found for: PROTIME, INR, APTT    HCG (If Applicable): No results found for: PREGTESTUR, PREGSERUM, HCG, HCGQUANT     ABGs: No results found for: PHART, PO2ART, UQN6HCH, ABV7TNN, BEART, J0MHCCVK     Type & Screen (If Applicable):  No results found for: LABABO, LABRH    Drug/Infectious Status (If Applicable):  No results found for: HIV, HEPCAB    COVID-19 Screening (If Applicable):   Lab Results   Component Value Date    COVID19 NOT DETECTED 06/01/2021           Anesthesia Evaluation  Patient summary reviewed   history of anesthetic complications: PONV.   Airway: Mallampati: II  TM distance: >3 FB   Neck ROM: full  Mouth opening: > = 3 FB Dental:          Pulmonary:normal exam    (+) COPD:  sleep apnea: on CPAP,                             Cardiovascular:  Exercise tolerance: good (>4 METS),   (+) hypertension:,          Beta Blocker:  Dose within 24 Hrs Neuro/Psych:   Negative Neuro/Psych ROS              GI/Hepatic/Renal:   (+) GERD:, morbid obesity          Endo/Other: Negative Endo/Other ROS                    Abdominal:   (+) obese,         Vascular: negative vascular ROS. Anesthesia Plan      MAC     ASA 3       Induction: intravenous. Anesthetic plan and risks discussed with patient. SCOUT Pulido CRNA   6/5/2021        Pre Anesthesia Evaluation complete. Anesthesia plan, risks, benefits, alternatives, and personnel discussed with patient and/or legal guardian. Patient and/or legal guardian verbalized an understanding and agreed to proceed. Anesthesia plan discussed with care team members and agreed upon.   SCOUT Pulido CRNA  6/8/2021

## 2021-06-07 NOTE — PROGRESS NOTES
Attempted to call patient about procedure time for tomorrow- mailbox full Called dr Farah Officer office given wife #- called and did inform her pts mailbox full and procedure at [phone] with arrival at 1971

## 2021-06-08 ENCOUNTER — ANESTHESIA (OUTPATIENT)
Dept: ENDOSCOPY | Age: 46
End: 2021-06-08
Payer: COMMERCIAL

## 2021-06-08 ENCOUNTER — HOSPITAL ENCOUNTER (OUTPATIENT)
Age: 46
Setting detail: OUTPATIENT SURGERY
Discharge: HOME | End: 2021-06-08
Attending: SURGERY | Admitting: SURGERY
Payer: COMMERCIAL

## 2021-06-08 VITALS
OXYGEN SATURATION: 97 % | DIASTOLIC BLOOD PRESSURE: 70 MMHG | HEART RATE: 50 BPM | WEIGHT: 310 LBS | BODY MASS INDEX: 44.38 KG/M2 | HEIGHT: 70 IN | RESPIRATION RATE: 16 BRPM | SYSTOLIC BLOOD PRESSURE: 142 MMHG | TEMPERATURE: 97.1 F

## 2021-06-08 VITALS — SYSTOLIC BLOOD PRESSURE: 129 MMHG | OXYGEN SATURATION: 93 % | DIASTOLIC BLOOD PRESSURE: 70 MMHG

## 2021-06-08 PROBLEM — E66.01 MORBID OBESITY WITH BMI OF 40.0-44.9, ADULT (HCC): Status: ACTIVE | Noted: 2021-06-08

## 2021-06-08 LAB
EKG ATRIAL RATE: 53 BPM
EKG DIAGNOSIS: NORMAL
EKG P AXIS: 43 DEGREES
EKG P-R INTERVAL: 184 MS
EKG Q-T INTERVAL: 420 MS
EKG QRS DURATION: 100 MS
EKG QTC CALCULATION (BAZETT): 394 MS
EKG R AXIS: 5 DEGREES
EKG T AXIS: 21 DEGREES
EKG VENTRICULAR RATE: 53 BPM

## 2021-06-08 PROCEDURE — 3700000000 HC ANESTHESIA ATTENDED CARE: Performed by: SURGERY

## 2021-06-08 PROCEDURE — 88305 TISSUE EXAM BY PATHOLOGIST: CPT

## 2021-06-08 PROCEDURE — 2709999900 HC NON-CHARGEABLE SUPPLY: Performed by: SURGERY

## 2021-06-08 PROCEDURE — 7100000011 HC PHASE II RECOVERY - ADDTL 15 MIN: Performed by: SURGERY

## 2021-06-08 PROCEDURE — 93010 ELECTROCARDIOGRAM REPORT: CPT | Performed by: INTERNAL MEDICINE

## 2021-06-08 PROCEDURE — 2500000003 HC RX 250 WO HCPCS: Performed by: NURSE ANESTHETIST, CERTIFIED REGISTERED

## 2021-06-08 PROCEDURE — 88342 IMHCHEM/IMCYTCHM 1ST ANTB: CPT

## 2021-06-08 PROCEDURE — 93005 ELECTROCARDIOGRAM TRACING: CPT | Performed by: ANESTHESIOLOGY

## 2021-06-08 PROCEDURE — 2580000003 HC RX 258: Performed by: SURGERY

## 2021-06-08 PROCEDURE — 87077 CULTURE AEROBIC IDENTIFY: CPT

## 2021-06-08 PROCEDURE — 6360000002 HC RX W HCPCS: Performed by: NURSE ANESTHETIST, CERTIFIED REGISTERED

## 2021-06-08 PROCEDURE — 3609012400 HC EGD TRANSORAL BIOPSY SINGLE/MULTIPLE: Performed by: SURGERY

## 2021-06-08 PROCEDURE — 7100000010 HC PHASE II RECOVERY - FIRST 15 MIN: Performed by: SURGERY

## 2021-06-08 PROCEDURE — 43239 EGD BIOPSY SINGLE/MULTIPLE: CPT | Performed by: SURGERY

## 2021-06-08 PROCEDURE — 3700000001 HC ADD 15 MINUTES (ANESTHESIA): Performed by: SURGERY

## 2021-06-08 RX ORDER — PROPOFOL 10 MG/ML
INJECTION, EMULSION INTRAVENOUS PRN
Status: DISCONTINUED | OUTPATIENT
Start: 2021-06-08 | End: 2021-06-08 | Stop reason: SDUPTHER

## 2021-06-08 RX ORDER — LIDOCAINE HYDROCHLORIDE 20 MG/ML
INJECTION, SOLUTION EPIDURAL; INFILTRATION; INTRACAUDAL; PERINEURAL PRN
Status: DISCONTINUED | OUTPATIENT
Start: 2021-06-08 | End: 2021-06-08 | Stop reason: SDUPTHER

## 2021-06-08 RX ORDER — SODIUM CHLORIDE, SODIUM LACTATE, POTASSIUM CHLORIDE, CALCIUM CHLORIDE 600; 310; 30; 20 MG/100ML; MG/100ML; MG/100ML; MG/100ML
INJECTION, SOLUTION INTRAVENOUS CONTINUOUS
Status: DISCONTINUED | OUTPATIENT
Start: 2021-06-08 | End: 2021-06-08 | Stop reason: HOSPADM

## 2021-06-08 RX ADMIN — LIDOCAINE HYDROCHLORIDE 100 MG: 20 INJECTION, SOLUTION EPIDURAL; INFILTRATION; INTRACAUDAL; PERINEURAL at 10:52

## 2021-06-08 RX ADMIN — PROPOFOL 300 MG: 10 INJECTION, EMULSION INTRAVENOUS at 10:52

## 2021-06-08 RX ADMIN — SODIUM CHLORIDE, POTASSIUM CHLORIDE, SODIUM LACTATE AND CALCIUM CHLORIDE: 600; 310; 30; 20 INJECTION, SOLUTION INTRAVENOUS at 10:04

## 2021-06-08 ASSESSMENT — PAIN SCALES - GENERAL
PAINLEVEL_OUTOF10: 0
PAINLEVEL_OUTOF10: 0

## 2021-06-08 ASSESSMENT — PAIN - FUNCTIONAL ASSESSMENT: PAIN_FUNCTIONAL_ASSESSMENT: 0-10

## 2021-06-08 NOTE — ANESTHESIA POSTPROCEDURE EVALUATION
Department of Anesthesiology  Postprocedure Note    Patient: Jimmy Bose  MRN: [id number]  YOB: 1975  Date of evaluation: 6/8/2021  Time:  11:07 AM     Procedure Summary     Date: 06/08/21 Room / Location: [address] [place] [address]    Anesthesia Start: 7021 Anesthesia Stop: 1107    Procedure: EGD BIOPSY (N/A ) Diagnosis: (MORBID OBESITY)    Surgeons: Alix Arrieta MD Responsible Provider: Misty Villavicencio MD    Anesthesia Type: MAC ASA Status: 3          Anesthesia Type: MAC    Oneyda Phase I:   10  Oneyda Phase II:  10    Last vitals: Reviewed and per EMR flowsheets.        Anesthesia Post Evaluation    Patient location during evaluation: bedside  Patient participation: complete - patient participated  Level of consciousness: awake and alert  Pain score: 0  Airway patency: patent  Nausea & Vomiting: no nausea and no vomiting  Complications: no  Cardiovascular status: hemodynamically stable  Respiratory status: acceptable, room air, spontaneous ventilation and nonlabored ventilation  Hydration status: euvolemic

## 2021-06-08 NOTE — H&P
HISTORY AND PHYSICAL EXAM    Date of Admission:  (Not on file)    PCP:  Cassy Toth MD    Chief Complaint / History of Present Illness:  Mateus Granados is a very pleasant 41 y.o. male who presents today for his preop EGD eval before his bariatric procedure. As noted his Body mass index is 44.05 kg/m². with significant co-morbidities as below. The patient has been complying with the pre-operative workup, lifestyle modifications and changes with the bariatric clinic, including:  Dietitian evaluation and counseling, psychologist evaluation and counseling, Exercise physiologist evaluation and counseling, cardiac preoperative clearance and optimization, pulmonology preoperative clearance and optimization, today will proceed with preoperative EGD for GERD, morbid obesity: Body mass index is 44.05 kg/m². , in preparation for a bariatric procedure; to r/o GERD, Hiatal Hernia, Peptic Ulcer Disease, Gastroparesis, Esophageal dysmotility; etc.    All risks and benefits, potential complications and possible alternatives discussed, and agreeable to proceed. PMH:   has a past medical history of COPD (chronic obstructive pulmonary disease) ([address] GERD (gastroesophageal reflux disease), Hypertension, Kidney stone, PONV (postoperative nausea and vomiting), Preop pulmonary/respiratory exam (4/16/2021), Short of breath on exertion, Sleep apnea, and Vertigo. PSH:   has a past surgical history that includes Hand surgery (Right, 1999); Carpal tunnel release (Right, 2012); Tonsillectomy and adenoidectomy ( age of 9 yrs); External ear surgery (Right, 2001); hernia repair (Right, last one 2013); Cholecystectomy (9/9/14); Umbilical hernia repair (9/9/14); Umbilical hernia repair (10/06/15); Cystoscopy (Right, 3/4/2019); Colonoscopy; and Endoscopy, colon, diagnostic. Allergies:  No Known Allergies     Home Meds:    Prior to Admission medications    Medication Sig Start Date End Date Taking?  Authorizing Provider lansoprazole (PREVACID) 30 MG delayed release capsule Take 1 capsule by mouth daily 4/19/21  Yes Historical Provider, MD   amLODIPine-valsartan (EXFORGE) 5-320 MG per tablet Take 1 tablet by mouth daily 3/23/21  Yes Historical Provider, MD   metoprolol tartrate (LOPRESSOR) 50 MG tablet Take 50 mg by mouth 2 times daily   Yes Historical Provider, MD   PROVENTIL  (90 BASE) MCG/ACT inhaler Inhale 2 puffs into the lungs every 6 hours as needed for Wheezing. 6/4/14   Syed Thomas MD        Hospital Meds:    No current facility-administered medications for this encounter. Current Outpatient Medications   Medication Sig Dispense Refill    lansoprazole (PREVACID) 30 MG delayed release capsule Take 1 capsule by mouth daily      amLODIPine-valsartan (EXFORGE) 5-320 MG per tablet Take 1 tablet by mouth daily      metoprolol tartrate (LOPRESSOR) 50 MG tablet Take 50 mg by mouth 2 times daily      PROVENTIL  (90 BASE) MCG/ACT inhaler Inhale 2 puffs into the lungs every 6 hours as needed for Wheezing. 1 Inhaler 3       Social History / Family History:        Social History     Socioeconomic History    Marital status:      Spouse name: None    Number of children: None    Years of education: None    Highest education level: None   Occupational History    None   Tobacco Use    Smoking status: Never Smoker    Smokeless tobacco: Never Used   Vaping Use    Vaping Use: Never used   Substance and Sexual Activity    Alcohol use: No     Comment:         CAFFEINE: 48 oz Mountain Dews daily.  Drug use: No    Sexual activity: None   Other Topics Concern    None   Social History Narrative    None     Social Determinants of Health     Financial Resource Strain:     Difficulty of Paying Living Expenses:    Food Insecurity:     Worried About Running Out of Food in the Last Year:     [address] in the Last Year:    Transportation Needs:     Lack of Transportation (Medical):      Lack turgor normal.  Neurologic: Grossly normal, Cranial nerves from II to XII intact. Radiologic / Imaging / TESTING  No results found. Labs:    No results found for this or any previous visit (from the past 24 hour(s)). Diagnosis:  Patient Active Problem List   Diagnosis    SOB (shortness of breath) on exertion    Snoring    Excessive sleepiness    Umbilical hernia without obstruction and without gangrene    SOFIA (obstructive sleep apnea)    Morbid obesity ([address]    Ureteral stone    Morbid obesity with BMI of 45.0-49.9, adult [facility])           Assessment & Plan:    Yvette Batista is a very pleasant 41 y.o. male who presents today for his preop EGD eval before his bariatric procedure. As noted his Body mass index is 44.05 kg/m². with significant co-morbidities as below. The patient has been complying with the pre-operative workup, lifestyle modifications and changes with the bariatric clinic, including:  Dietitian evaluation and counseling, psychologist evaluation and counseling, Exercise physiologist evaluation and counseling, cardiac preoperative clearance and optimization, pulmonology preoperative clearance and optimization, today will proceed with preoperative EGD for GERD, morbid obesity: Body mass index is 44.05 kg/m². , in preparation for a bariatric procedure; to r/o GERD, Hiatal Hernia, Peptic Ulcer Disease, Gastroparesis, Esophageal dysmotility; etc.    All risks and benefits, potential complications and possible alternatives discussed, and agreeable to proceed.     ____________________________________________    Donna Ortega MD, FACS, FICS    6/8/2021  6:59 AM

## 2021-06-09 LAB — CLOTEST: NEGATIVE

## 2021-06-16 ENCOUNTER — OFFICE VISIT (OUTPATIENT)
Dept: BARIATRICS/WEIGHT MGMT | Age: 46
End: 2021-06-16
Payer: COMMERCIAL

## 2021-06-16 VITALS
HEIGHT: 70 IN | WEIGHT: 310.1 LBS | SYSTOLIC BLOOD PRESSURE: 128 MMHG | DIASTOLIC BLOOD PRESSURE: 72 MMHG | HEART RATE: 58 BPM | OXYGEN SATURATION: 97 % | TEMPERATURE: 97.8 F | BODY MASS INDEX: 44.39 KG/M2

## 2021-06-16 DIAGNOSIS — G47.33 OBSTRUCTIVE SLEEP APNEA: Primary | ICD-10-CM

## 2021-06-16 DIAGNOSIS — E66.01 MORBID OBESITY WITH BMI OF 40.0-44.9, ADULT (HCC): ICD-10-CM

## 2021-06-16 PROCEDURE — 99213 OFFICE O/P EST LOW 20 MIN: CPT | Performed by: SURGERY

## 2021-06-16 NOTE — PROGRESS NOTES
BARIATRIC SURGERY OFFICE NOTE    SUBJECTIVE:    Patient presenting today originally referred from Mgiuel Vaughan MD, for   Chief Complaint   Patient presents with    Follow-up     f/u 3rd surg wm w/edg path   . HPI: Tremayne Mena is a 41 y.o. male being seen for follow up for bariatric weight loss surgery. Richard'slast month weight gain/loss was -8.5 Lbs. EGD last week: Addressed the status of the following co-morbidities:   1. GERD  NONE. 2. COPD  MISDIAGNOSIS. 3. SOFIA  improving with BIPAP. Nicely loosing weight,   Cardiac - Pulm cleared, EGD cleared, and Psych is next. Thoroughly reviewed the patient's medical history, family history, social history and review of systems with the patient today in theoffice. Please see medical record for pertinent positives. Past Medical History:   Diagnosis Date    COPD (chronic obstructive pulmonary disease) (HCC)     see Dr Michael Xie GERD (gastroesophageal reflux disease)     before gallbladder removed now it's ok    Hypertension     \"had since 2005- see Dr Isidro Elena\"they did a stress and echo after I came with chest pain 7/2014- not my heart the tests all clear- it was the gallbladder\"    Kidney stone     \"have a kidney stone now- have passed 2 or 3 in the past\"    PONV (postoperative nausea and vomiting)     PONV    Preop pulmonary/respiratory exam 4/16/2021    Short of breath on exertion     Sleep apnea     has sleep study 2014- use a c-pap machine for the past 8 weeks    Vertigo     \"noticed since I have been on the Topomax for weight loss\" No longer on topamax.       Past Surgical History:   Procedure Laterality Date    CARPAL TUNNEL RELEASE Right 2012    CHOLECYSTECTOMY  9/9/14    COLONOSCOPY      CYSTOSCOPY Right 3/4/2019    CYSTOSCOPY RETROGRADE PYLEOGRAMS STONE MANIPULATION, with stent insertion performed by Herson Brooks MD at [facility], COLON, DIAGNOSTIC      EXTERNAL EAR SURGERY Right 2001 \"Swollen lymph node\"    HAND SURGERY Right 1999    Thumb reconstruction    HERNIA REPAIR Right last one 2013    inguinal x 5    TONSILLECTOMY AND ADENOIDECTOMY   age of 9 yrs    UMBILICAL HERNIA REPAIR  7/7/72    UMBILICAL HERNIA REPAIR  10/06/15    laparoscopic    UPPER GASTROINTESTINAL ENDOSCOPY N/A 6/8/2021    EGD BIOPSY performed by Dali Vang [facility] at [facility] END[facility]COPY     Current Outpatient Medications   Medication Sig Dispense Refill    lansoprazole (PREVACID) 30 MG delayed release capsule Take 1 capsule by mouth daily      amLODIPine-valsartan (EXFORGE) 5-320 MG per tablet Take 1 tablet by mouth daily      metoprolol tartrate (LOPRESSOR) 50 MG tablet Take 50 mg by mouth 2 times daily      PROVENTIL  (90 BASE) MCG/ACT inhaler Inhale 2 puffs into the lungs every 6 hours as needed for Wheezing. 1 Inhaler 3     No current facility-administered medications for this visit. No Known Allergies        Review of Systems      OBJECTIVE:    Vitals:    06/16/21 0954   BP: 128/72   Pulse: 58   Temp: 97.8 °F (36.6 °C)   SpO2: 97%     Body mass index is 44.49 kg/m². Physical Exam            ASSESSMENT & PLAN:    1. Obstructive sleep apnea    2. Morbid obesity with BMI of 40.0-44.9, adult ([address]           Nicely loosing weight,   Cardiac - Pulm cleared, EGD cleared, and Psych is next. Path from EGD:  Final Pathologic Diagnosis:   A.  Biopsy gastric antral/fundal mucosa:        Mild chronic gastritis. B.  Benign gastric fundic polyp. Electronically Signed Out By Helio Jennings MD   Comment:   Small intestinal metaplasia, dysplasia, and H. pylori are   not seen. Specimens Received:   A: Antrum biopsy   B: Gastric polyp     Patient was encouraged to journal all food intake. Keep calorie level atapproximately 0732-6598. Protein intake is to be a minimum of 60-80 grams per day. Water drinking was encouraged with a goal of 64oz-128oz daily. Beverages to be calorie free except for milk. Every other beverage should bewater. They are to avoid soda. Continue to increase level of physical activity. I counseled the patient regarding diet and exercise, in preparation for his planned Robotic Sleeve Gastrectomy. Counting calories, complying with the dietitian's recommendations, and complying with the preoperative workup including the dietitian counseling, exercise physiologist counseling,cardiologist evaluation and pre-operative optimization, pulmonologist evaluation and pre operative optimization, pre operative EGD evaluation. The patient expressed understanding and willingness to comply nicely; allquestions and concerns addressed in details. Patient counseled on the risks, benefits, and alternatives oftreatment plan at length while in the office today. Patient states an understanding and willingness to proceed with the plan. Orders Placed This Encounter   Procedures    External Referral To Psychology        Follow Up:  Return in about 4 weeks (around 7/14/2021) for Bariatric follow up: diet, exercise & weight loss, Follow up Symptoms.       Aileen Tinoco MD, FACS, FICS  Member of the [employer] of [employer] and [employer]ric Sur[employer]    [phone]    6/16/21

## 2021-07-16 ENCOUNTER — OFFICE VISIT (OUTPATIENT)
Dept: BARIATRICS/WEIGHT MGMT | Age: 46
End: 2021-07-16
Payer: COMMERCIAL

## 2021-07-16 VITALS
SYSTOLIC BLOOD PRESSURE: 120 MMHG | HEART RATE: 58 BPM | WEIGHT: 312.2 LBS | OXYGEN SATURATION: 98 % | HEIGHT: 70 IN | DIASTOLIC BLOOD PRESSURE: 86 MMHG | BODY MASS INDEX: 44.69 KG/M2

## 2021-07-16 DIAGNOSIS — E66.01 MORBID OBESITY WITH BMI OF 40.0-44.9, ADULT (HCC): Primary | ICD-10-CM

## 2021-07-16 PROCEDURE — 99213 OFFICE O/P EST LOW 20 MIN: CPT | Performed by: SURGERY

## 2021-07-16 RX ORDER — PREDNISONE 1 MG/1
1 TABLET ORAL DAILY
COMMUNITY
End: 2021-10-12

## 2021-07-16 ASSESSMENT — ENCOUNTER SYMPTOMS
CONSTIPATION: 0
SORE THROAT: 0
BLOOD IN STOOL: 0
WHEEZING: 0
TROUBLE SWALLOWING: 0
COLOR CHANGE: 0
ABDOMINAL PAIN: 0
VOICE CHANGE: 0
COUGH: 0
DIARRHEA: 0
NAUSEA: 0
ANAL BLEEDING: 0
VOMITING: 0
PHOTOPHOBIA: 0
SHORTNESS OF BREATH: 0

## 2021-07-16 NOTE — PROGRESS NOTES
BARIATRIC SURGERY OFFICE NOTE    SUBJECTIVE:    Patient presenting today originally referred from Sawyer Tanner MD, for   Chief Complaint   Patient presents with    Weight Management     5th surg wm    . HPI: Eugene Locke is a 41 y.o. male being seen for follow up for bariatric weight loss surgery. Corbin'slast month weight gain/loss was + 2.1 Lbs ON PREDNISONE,   Added exercise. Addressed the status of the following co-morbidities:   1. Weight loss  worsening. 2. Arthritis  improving. 3. Exercise  improving. Will get off the prednisone tomorrow, and will loose ~ 10 Lbs before next month. Thoroughly reviewed the patient's medical history, family history, social history and review of systems with the patient today in theoffice. Please see medical record for pertinent positives. Past Medical History:   Diagnosis Date    COPD (chronic obstructive pulmonary disease) (HCC)     see Dr Jaguar Anaya GERD (gastroesophageal reflux disease)     before gallbladder removed now it's ok    Hypertension     \"had since 2005- see Dr Radha Elena\"they did a stress and echo after I came with chest pain 7/2014- not my heart the tests all clear- it was the gallbladder\"    Kidney stone     \"have a kidney stone now- have passed 2 or 3 in the past\"    PONV (postoperative nausea and vomiting)     PONV    Preop pulmonary/respiratory exam 4/16/2021    Short of breath on exertion     Sleep apnea     has sleep study 2014- use a c-pap machine for the past 8 weeks    Vertigo     \"noticed since I have been on the Topomax for weight loss\" No longer on topamax.       Past Surgical History:   Procedure Laterality Date    CARPAL TUNNEL RELEASE Right 2012    CHOLECYSTECTOMY  9/9/14    COLONOSCOPY      CYSTOSCOPY Right 3/4/2019    CYSTOSCOPY RETROGRADE PYLEOGRAMS STONE MANIPULATION, with stent insertion performed by Dontaflynn Yu MD at [address] [place] [address], DIAGNOSTIC      EXTERNAL EAR SURGERY Right 2001    \"Swollen lymph node\"    HAND SURGERY Right 1999    Thumb reconstruction    HERNIA REPAIR Right last one 2013    inguinal x 5    TONSILLECTOMY AND ADENOIDECTOMY   age of 9 yrs    UMBILICAL HERNIA REPAIR  7/5/09    UMBILICAL HERNIA REPAIR  10/06/15    laparoscopic    UPPER GASTROINTESTINAL ENDOSCOPY N/A 6/8/2021    EGD BIOPSY performed by Kenzie Tucker MD at [facility] ENDOSCOPY     Current Outpatient Medications   Medication Sig Dispense Refill    predniSONE (DELTASONE) 1 MG tablet Take 1 mg by mouth daily      lansoprazole (PREVACID) 30 MG delayed release capsule Take 1 capsule by mouth daily      amLODIPine-valsartan (EXFORGE) 5-320 MG per tablet Take 1 tablet by mouth daily      metoprolol tartrate (LOPRESSOR) 50 MG tablet Take 50 mg by mouth 2 times daily      PROVENTIL  (90 BASE) MCG/ACT inhaler Inhale 2 puffs into the lungs every 6 hours as needed for Wheezing. 1 Inhaler 3     No current facility-administered medications for this visit. No Known Allergies        Review of Systems   Constitutional: Negative for activity change, chills, diaphoresis and fever. HENT: Negative for sore throat, trouble swallowing and voice change. Eyes: Negative for photophobia and visual disturbance. Respiratory: Negative for cough, shortness of breath and wheezing. Cardiovascular: Negative for chest pain, palpitations and leg swelling. Gastrointestinal: Negative for abdominal pain, anal bleeding, blood in stool, constipation, diarrhea, nausea and vomiting. Endocrine: Negative for cold intolerance, heat intolerance, polydipsia and polyuria. Genitourinary: Negative for dysuria, frequency and hematuria. Musculoskeletal: Negative for joint swelling, myalgias and neck stiffness. Skin: Negative for color change and rash. Neurological: Negative for seizures, speech difficulty, light-headedness and numbness. Hematological: Negative for adenopathy. Does not bruise/bleed easily. OBJECTIVE:    Vitals:    07/16/21 0947   BP: 120/86   Pulse: 58   SpO2: 98%     Body mass index is 44.8 kg/m². Physical Exam  Vitals reviewed. Constitutional:       General: He is not in acute distress. Appearance: He is well-developed. He is not diaphoretic. HENT:      Head: Normocephalic and atraumatic. Eyes:      General: No scleral icterus. Conjunctiva/sclera: Conjunctivae normal.      Pupils: Pupils are equal, round, and reactive to light. Neck:      Thyroid: No thyromegaly. Vascular: No JVD. Trachea: No tracheal deviation. Cardiovascular:      Rate and Rhythm: Normal rate and regular rhythm. Heart sounds: Normal heart sounds. No murmur heard. No friction rub. No gallop. Pulmonary:      Effort: Pulmonary effort is normal. No respiratory distress. Breath sounds: No stridor. No wheezing or rales. Chest:      Chest wall: No tenderness. Abdominal:      General: Bowel sounds are normal. There is no distension. Palpations: Abdomen is soft. There is no mass. Tenderness: There is no abdominal tenderness. There is no guarding or rebound. Musculoskeletal:         General: No tenderness. Normal range of motion. Cervical back: Normal range of motion. Lymphadenopathy:      Cervical: No cervical adenopathy. Skin:     General: Skin is warm and dry. Coloration: Skin is not pale. Findings: No erythema or rash. Neurological:      Mental Status: He is alert and oriented to person, place, and time. Cranial Nerves: No cranial nerve deficit. Coordination: Coordination normal.   Psychiatric:         Behavior: Behavior normal.         Thought Content: Thought content normal.         Judgment: Judgment normal.       Final Pathologic Diagnosis:   A.  Biopsy gastric antral/fundal mucosa:        Mild chronic gastritis. B.  Benign gastric fundic polyp. Electronically Signed Out By Óscar Roblero MD          ASSESSMENT & PLAN:    1. Morbid obesity with BMI of 40.0-44.9, adult ([address]           Will get off the prednisone tomorrow, and will loose ~ 10 Lbs before next month. Patient was encouraged to journal all food intake. Keep calorie level atapproximately 9406-6108. Protein intake is to be a minimum of 60-80 grams per day. Water drinking was encouraged with a goal of 64oz-128oz daily. Beverages to be calorie free except for milk. Every other beverage should bewater. They are to avoid soda. Continue to increase level of physical activity. I counseled the patient regarding diet and exercise, in preparation for his planned Robotic Sleeve Gastrectomy. Counting calories, complying with the dietitian's recommendations, and complying with the preoperative workup including the dietitian counseling, exercise physiologist counseling,cardiologist evaluation and pre-operative optimization, pulmonologist evaluation and pre operative optimization, pre operative EGD evaluation. The patient expressed understanding and willingness to comply nicely; allquestions and concerns addressed in details. Patient counseled on the risks, benefits, and alternatives oftreatment plan at length while in the office today. Patient states an understanding and willingness to proceed with the plan. Follow Up:  Return in about 4 weeks (around 8/13/2021) for Bariatric follow up: diet, exercise & weight loss, Follow up Symptoms.       Brittny Sanchez MD, FACS, FICS  Member of the [employer] of [employer] and [employer]ric Surgeons    [phone]    7/16/21

## 2021-08-16 ENCOUNTER — TELEPHONE (OUTPATIENT)
Dept: PULMONOLOGY | Age: 46
End: 2021-08-16

## 2021-08-16 PROBLEM — Z01.818 PREOPERATIVE CLEARANCE: Status: ACTIVE | Noted: 2021-08-16

## 2021-08-16 NOTE — TELEPHONE ENCOUNTER
Please review last note to see if patient is cleared for bariatric surgery. If he is cleared please addend last note.   Please advise

## 2021-08-16 NOTE — ASSESSMENT & PLAN NOTE
Patient is cleared for the Bariatric Surgery  Incentive Spirometry pre and post op  Advised to be compliant with the BIPAP  Loose weight

## 2021-09-15 PROBLEM — Z01.818 PREOPERATIVE CLEARANCE: Status: RESOLVED | Noted: 2021-08-16 | Resolved: 2021-09-15

## 2021-09-24 ENCOUNTER — HOSPITAL ENCOUNTER (OUTPATIENT)
Age: 46
Discharge: HOME | End: 2021-09-24
Payer: COMMERCIAL

## 2021-09-24 ENCOUNTER — OFFICE VISIT (OUTPATIENT)
Dept: BARIATRICS/WEIGHT MGMT | Age: 46
End: 2021-09-24
Payer: COMMERCIAL

## 2021-09-24 VITALS
BODY MASS INDEX: 43.91 KG/M2 | HEIGHT: 70 IN | HEART RATE: 67 BPM | SYSTOLIC BLOOD PRESSURE: 160 MMHG | DIASTOLIC BLOOD PRESSURE: 100 MMHG | WEIGHT: 306.7 LBS | OXYGEN SATURATION: 97 % | RESPIRATION RATE: 17 BRPM

## 2021-09-24 DIAGNOSIS — Z01.818 PRE-OPERATIVE CLEARANCE: Primary | ICD-10-CM

## 2021-09-24 DIAGNOSIS — Z01.818 PRE-OPERATIVE CLEARANCE: ICD-10-CM

## 2021-09-24 DIAGNOSIS — E66.01 MORBID OBESITY DUE TO EXCESS CALORIES (HCC): ICD-10-CM

## 2021-09-24 LAB
ALBUMIN SERPL-MCNC: 4.6 GM/DL (ref 3.4–5)
ALP BLD-CCNC: 78 IU/L (ref 40–129)
ALT SERPL-CCNC: 33 U/L (ref 10–40)
AMPHETAMINES: NEGATIVE
ANION GAP SERPL CALCULATED.3IONS-SCNC: 11 MMOL/L (ref 4–16)
AST SERPL-CCNC: 27 IU/L (ref 15–37)
BARBITURATE SCREEN URINE: NEGATIVE
BENZODIAZEPINE SCREEN, URINE: NEGATIVE
BILIRUB SERPL-MCNC: 0.4 MG/DL (ref 0–1)
BUN BLDV-MCNC: 13 MG/DL (ref 6–23)
CALCIUM SERPL-MCNC: 9.1 MG/DL (ref 8.3–10.6)
CANNABINOID SCREEN URINE: NEGATIVE
CHLORIDE BLD-SCNC: 102 MMOL/L (ref 99–110)
CO2: 23 MMOL/L (ref 21–32)
COCAINE METABOLITE: NEGATIVE
CREAT SERPL-MCNC: 0.9 MG/DL (ref 0.9–1.3)
GFR AFRICAN AMERICAN: >60 ML/MIN/1.73M2
GFR NON-AFRICAN AMERICAN: >60 ML/MIN/1.73M2
GLUCOSE BLD-MCNC: 103 MG/DL (ref 70–99)
HCT VFR BLD CALC: 45.1 % (ref 42–52)
HEMOGLOBIN: 15 GM/DL (ref 13.5–18)
MCH RBC QN AUTO: 30.4 PG (ref 27–31)
MCHC RBC AUTO-ENTMCNC: 33.3 % (ref 32–36)
MCV RBC AUTO: 91.3 FL (ref 78–100)
OPIATES, URINE: NEGATIVE
OXYCODONE: NEGATIVE
PDW BLD-RTO: 12.6 % (ref 11.7–14.9)
PHENCYCLIDINE, URINE: NEGATIVE
PLATELET # BLD: 180 K/CU MM (ref 140–440)
PMV BLD AUTO: 11.2 FL (ref 7.5–11.1)
POTASSIUM SERPL-SCNC: 4.4 MMOL/L (ref 3.5–5.1)
RBC # BLD: 4.94 M/CU MM (ref 4.6–6.2)
SODIUM BLD-SCNC: 136 MMOL/L (ref 135–145)
TOTAL PROTEIN: 6.8 GM/DL (ref 6.4–8.2)
WBC # BLD: 6 K/CU MM (ref 4–10.5)

## 2021-09-24 PROCEDURE — 36415 COLL VENOUS BLD VENIPUNCTURE: CPT

## 2021-09-24 PROCEDURE — 85027 COMPLETE CBC AUTOMATED: CPT

## 2021-09-24 PROCEDURE — 80307 DRUG TEST PRSMV CHEM ANLYZR: CPT

## 2021-09-24 PROCEDURE — 99213 OFFICE O/P EST LOW 20 MIN: CPT | Performed by: NURSE PRACTITIONER

## 2021-09-24 PROCEDURE — 80053 COMPREHEN METABOLIC PANEL: CPT

## 2021-09-24 ASSESSMENT — ENCOUNTER SYMPTOMS
GASTROINTESTINAL NEGATIVE: 1
EYES NEGATIVE: 1
ALLERGIC/IMMUNOLOGIC NEGATIVE: 1
RESPIRATORY NEGATIVE: 1

## 2021-09-24 NOTE — PROGRESS NOTES
BARIATRIC SURGERY OFFICE PROGRESS NOTE    SUBJECTIVE:    Patient presenting today referred from Amelia Mauro MD, for   Chief Complaint   Patient presents with   [facility] Weight Management     6th surgery visit   . Vitals:    09/24/21 1110   BP: (!) 160/100   Pulse: 67   Resp: 17   SpO2: 97%        BMI: Body mass index is 44.01 kg/m². Weight History: Wt Readings from Last 3 Encounters:   09/24/21 (!) 306 lb 11.2 oz (139.1 kg)   07/16/21 (!) 312 lb 3.2 oz (141.6 kg)   06/16/21 (!) 310 lb 1.6 oz (140.7 kg)         HPI:Corbin Landis is a 40 y.o. male presenting in sixth bariatric PRE-OP visit    Diet Recall: Total weight loss/gain: -5.5 lbs since last visit, and -26.6 lbs since starting program     Calories: 7882-6992/ day  Water Intake: 6-7 bottles a day  Exercise: has a torn rotator cuff and hernia, has been doing PT  New health problems: denies  New medications: denies  Clearances: pulm- cleared. Psych cleared. Cardiology seen at [place] [id number] cardiology    Thoroughly reviewed the patient's medical history, family history, social history and review of systems with the patient today in the office. Please see medical record for pertinent positives. Past Medical History:   Diagnosis Date    COPD (chronic obstructive pulmonary disease) (HCC)     see Dr Pete Gloria GERD (gastroesophageal reflux disease)     before gallbladder removed now it's ok    Hypertension     \"had since 2005- see Dr Jameson Elena\"they did a stress and echo after I came with chest pain 7/2014- not my heart the tests all clear- it was the gallbladder\"    Kidney stone     \"have a kidney stone now- have passed 2 or 3 in the past\"    PONV (postoperative nausea and vomiting)     PONV    Preop pulmonary/respiratory exam 4/16/2021    Short of breath on exertion     Sleep apnea     has sleep study 2014- use a c-pap machine for the past 8 weeks    Vertigo     \"noticed since I have been on the Topomax for weight loss\" No longer on topamax. Patient Active Problem List   Diagnosis    SOB (shortness of breath) on exertion    Snoring    Excessive sleepiness    Umbilical hernia without obstruction and without gangrene    SOFIA (obstructive sleep apnea)    Morbid obesity ([address]    Ureteral stone    Morbid obesity with BMI of 45.0-49.9, adult ([address]    Morbid obesity with BMI of 40.0-44.9, adult ([address]    Obstructive sleep apnea       Past Surgical History:   Procedure Laterality Date    CARPAL TUNNEL RELEASE Right 2012    CHOLECYSTECTOMY  9/9/14    COLONOSCOPY      CYSTOSCOPY Right 3/4/2019    CYSTOSCOPY RETROGRADE PYLEOGRAMS STONE MANIPULATION, with stent insertion performed by Kaitlin Shaw MD at [address], COLON, DIAGNOSTIC      EXTERNAL EAR SURGERY Right 2001    \"Swollen lymph node\"    HAND SURGERY Right 1999    Thumb reconstruction    HERNIA REPAIR Right last one 2013    inguinal x 5    TONSILLECTOMY AND ADENOIDECTOMY   age of 9 yrs    UMBILICAL HERNIA REPAIR  8/4/83    UMBILICAL HERNIA REPAIR  10/06/15    laparoscopic    UPPER GASTROINTESTINAL ENDOSCOPY N/A 6/8/2021    EGD BIOPSY performed by Whit Ewing MD at [facility] ENDOSCOPY       Current Outpatient Medications   Medication Sig Dispense Refill    predniSONE (DELTASONE) 1 MG tablet Take 1 mg by mouth daily      lansoprazole (PREVACID) 30 MG delayed release capsule Take 1 capsule by mouth daily      amLODIPine-valsartan (EXFORGE) 5-320 MG per tablet Take 1 tablet by mouth daily      metoprolol tartrate (LOPRESSOR) 50 MG tablet Take 50 mg by mouth 2 times daily      PROVENTIL  (90 BASE) MCG/ACT inhaler Inhale 2 puffs into the lungs every 6 hours as needed for Wheezing. 1 Inhaler 3     No current facility-administered medications for this visit. No Known Allergies      Review of Systems   Constitutional: Negative. HENT: Negative. Eyes: Negative. Respiratory: Negative. Cardiovascular: Negative. Gastrointestinal: Negative. Endocrine: Negative. Genitourinary: Negative. Musculoskeletal: Negative. Allergic/Immunologic: Negative. Neurological: Negative. Hematological: Negative. Psychiatric/Behavioral: Negative. OBJECTIVE:    BP (!) 160/100   Pulse 67   Resp 17   Ht 5' 10\" (1.778 m)   Wt (!) 306 lb 11.2 oz (139.1 kg)   SpO2 97%   BMI 44.01 kg/m²      Physical Exam  Vitals reviewed. Constitutional:       Appearance: He is obese. HENT:      Head: Normocephalic and atraumatic. Right Ear: External ear normal.      Left Ear: External ear normal.      Nose: Nose normal.      Mouth/Throat:      Mouth: Mucous membranes are moist.   Eyes:      Extraocular Movements: Extraocular movements intact. Pupils: Pupils are equal, round, and reactive to light. Cardiovascular:      Rate and Rhythm: Normal rate and regular rhythm. Pulses: Normal pulses. Heart sounds: Normal heart sounds. Pulmonary:      Effort: Pulmonary effort is normal.      Breath sounds: Normal breath sounds. Abdominal:      General: Bowel sounds are normal.      Tenderness: There is no abdominal tenderness. Musculoskeletal:         General: Normal range of motion. Cervical back: Normal range of motion. Skin:     General: Skin is warm and dry. Neurological:      General: No focal deficit present. Mental Status: He is alert and oriented to person, place, and time. Mental status is at baseline. Psychiatric:         Mood and Affect: Mood normal.         Behavior: Behavior normal.         ASSESSMENT & PLAN:    1. Pre-operative clearance  - Nicotine and Metabolites; Future  - Urine Drug Screen; Future  - CBC; Future  - COMPREHENSIVE METABOLIC PANEL; Future  - All clearances completed. UDS and Nicotine ordered. Should be able to submit file at next appointment    2.  Morbid obesity due to excess calories [facility])  -Patient was encouraged to journal all food intake.   -Keep calorie level at approximately 1200, per discussion / plan with registered dietician.  -Protein intake is to be a minimum of 60 grams per day. -Water drinking was encouraged with a goal of 64oz-128oz daily. Beverages are to be calorie free except for milk. Avoid soda. -Continue to increase level of physical activity. I spent 25 minutes with the patient face to face today and over 50% of the office visit today was spent in face to face counseling regarding diet and exercise, in preparation for his planned Robotic Sleeve Gastrectomy. Discussed in length complying with the dietary recommendations, complying with the preoperative workup including dietary counseling , exercise physiologist counseling , and pre-operative optimization of pulmonologist  and cardiologist .    The patient expressed understanding and willingness to comply nicely; all questions and concerns addressed. No orders of the defined types were placed in this encounter. Orders Placed This Encounter   Procedures    Nicotine and Metabolites     Standing Status:   Future     Standing Expiration Date:   9/24/2022    Urine Drug Screen     Standing Status:   Future     Standing Expiration Date:   9/24/2022    CBC     Standing Status:   Future     Standing Expiration Date:   9/24/2022    COMPREHENSIVE METABOLIC PANEL     Standing Status:   Future     Standing Expiration Date:   9/24/2022       Follow Up:  Return in about 1 month (around 10/24/2021) for weight check.     SCOUT Vazquez - CNP

## 2021-10-04 ENCOUNTER — HOSPITAL ENCOUNTER (OUTPATIENT)
Dept: GENERAL RADIOLOGY | Age: 46
Discharge: HOME | End: 2021-10-04
Payer: COMMERCIAL

## 2021-10-04 ENCOUNTER — HOSPITAL ENCOUNTER (OUTPATIENT)
Age: 46
Discharge: HOME | End: 2021-10-04
Payer: COMMERCIAL

## 2021-10-04 DIAGNOSIS — R10.84 GENERALIZED ABDOMINAL PAIN: ICD-10-CM

## 2021-10-04 PROCEDURE — 74019 RADEX ABDOMEN 2 VIEWS: CPT

## 2021-10-05 ENCOUNTER — HOSPITAL ENCOUNTER (OUTPATIENT)
Age: 46
Discharge: HOME | End: 2021-10-05
Payer: COMMERCIAL

## 2021-10-05 PROCEDURE — G0480 DRUG TEST DEF 1-7 CLASSES: HCPCS

## 2021-10-09 LAB
3-OH-COTININE URINE: <50 NG/ML
ANABASINE URINE: <5 NG/ML
COTININE, URINE: <15 NG/ML
NICOTINE AND METABOLITES: <15 NG/ML

## 2021-10-12 ENCOUNTER — OFFICE VISIT (OUTPATIENT)
Dept: BARIATRICS/WEIGHT MGMT | Age: 46
End: 2021-10-12
Payer: COMMERCIAL

## 2021-10-12 VITALS
BODY MASS INDEX: 43.84 KG/M2 | HEIGHT: 70 IN | WEIGHT: 306.2 LBS | DIASTOLIC BLOOD PRESSURE: 84 MMHG | SYSTOLIC BLOOD PRESSURE: 144 MMHG | HEART RATE: 85 BPM

## 2021-10-12 DIAGNOSIS — E66.01 MORBID OBESITY DUE TO EXCESS CALORIES (HCC): Primary | ICD-10-CM

## 2021-10-12 PROCEDURE — 99214 OFFICE O/P EST MOD 30 MIN: CPT | Performed by: SURGERY

## 2021-10-12 ASSESSMENT — ENCOUNTER SYMPTOMS: BACK PAIN: 1

## 2021-10-12 NOTE — PROGRESS NOTES
BARIATRIC SURGERY OFFICE PROGRESS NOTE    SUBJECTIVE:    Patient presenting today referred from Victor M Ryan MD, for   Chief Complaint   Patient presents with   [facility] Weight Management     7th Surg WM Visit +Card, +Psych, +Pulm, -Darrin   . Vitals:    10/12/21 1153   BP: (!) 144/84   Pulse: 85        BMI: Body mass index is 43.94 kg/m². Weight History: Wt Readings from Last 3 Encounters:   10/12/21 (!) 306 lb 3.2 oz (138.9 kg)   09/24/21 (!) 306 lb 11.2 oz (139.1 kg)   07/16/21 (!) 312 lb 3.2 oz (141.6 kg)        If within 30 days of bariatric surgery date, have you been to the ED: N/A      HPI:Corbin Kumar is a 40 y.o. male presenting in seventh bariatric PRE-OP  visit. Total weight loss/gain: -0.5 lbs since last visit, n/a lbs since surgery, -27.1 lbs since starting program    Thoroughly reviewed the patient's medical history, family history, social history and review of systems with the patient today in the office. Please see medical record for pertinent positives. Changes in health since last visit: None, off prednisone. Was on low dose from shoulder surgery. Pt tracking calories: Yes. Pt exercising: Yes. Past Medical History:   Diagnosis Date    COPD (chronic obstructive pulmonary disease) ([facility])     see Dr Jocelynn Vale GERD (gastroesophageal reflux disease)     before gallbladder removed now it's ok    Hypertension     \"had since 2005- see Dr Ijeoma Elena\"they did a stress and echo after I came with chest pain 7/2014- not my heart the tests all clear- it was the gallbladder\"    Kidney stone     \"have a kidney stone now- have passed 2 or 3 in the past\"    PONV (postoperative nausea and vomiting)     PONV    Preop pulmonary/respiratory exam 4/16/2021    Short of breath on exertion     Sleep apnea     has sleep study 2014- use a c-pap machine for the past 8 weeks    Vertigo     \"noticed since I have been on the Topomax for weight loss\" No longer on topamax.         Patient Active Problem List   Diagnosis    SOB (shortness of breath) on exertion    Snoring    Excessive sleepiness    Umbilical hernia without obstruction and without gangrene    SOFIA (obstructive sleep apnea)    Morbid obesity ([address]    Ureteral stone    Morbid obesity with BMI of 45.0-49.9, adult ([address]    Morbid obesity with BMI of 40.0-44.9, adult ([address]    Obstructive sleep apnea       Past Surgical History:   Procedure Laterality Date    CARPAL TUNNEL RELEASE Right 2012    CHOLECYSTECTOMY  9/9/14    COLONOSCOPY      CYSTOSCOPY Right 3/4/2019    CYSTOSCOPY RETROGRADE PYLEOGRAMS STONE MANIPULATION, with stent insertion performed by Khushi Ray MD at [address] [place] [address] COLON, DIAGNOSTIC      EXTERNAL EAR SURGERY Right 2001    \"Swollen lymph node\"    HAND SURGERY Right 1999    Thumb reconstruction    HERNIA REPAIR Right last one 2013    inguinal x 5    TONSILLECTOMY AND ADENOIDECTOMY   age of 9 yrs    UMBILICAL HERNIA REPAIR  2/5/47    UMBILICAL HERNIA REPAIR  10/06/15    laparoscopic    UPPER GASTROINTESTINAL ENDOSCOPY N/A 6/8/2021    EGD BIOPSY performed by Monico Perdomo MD at [facility] ENDOSCOPY       Current Outpatient Medications   Medication Sig Dispense Refill    lansoprazole (PREVACID) 30 MG delayed release capsule Take 1 capsule by mouth daily      amLODIPine-valsartan (EXFORGE) 5-320 MG per tablet Take 1 tablet by mouth daily      metoprolol tartrate (LOPRESSOR) 50 MG tablet Take 50 mg by mouth 2 times daily      PROVENTIL  (90 BASE) MCG/ACT inhaler Inhale 2 puffs into the lungs every 6 hours as needed for Wheezing. (Patient not taking: Reported on 10/12/2021) 1 Inhaler 3     No current facility-administered medications for this visit. No Known Allergies      Review of Systems   Musculoskeletal: Positive for arthralgias and back pain. Skin: Positive for wound. All other systems reviewed and are negative.       OBJECTIVE:    BP (!) 144/84 (Site: Left Upper Arm, Position: Sitting, Cuff Size: Large Adult)   Pulse 85   Ht 5' 10\" (1.778 m)   Wt (!) 306 lb 3.2 oz (138.9 kg)   BMI 43.94 kg/m²      Physical Exam  Vitals reviewed. Constitutional:       General: He is not in acute distress. Appearance: He is obese. He is not ill-appearing, toxic-appearing or diaphoretic. HENT:      Head: Normocephalic and atraumatic. Right Ear: External ear normal.      Left Ear: External ear normal.      Nose: Nose normal.   Eyes:      General:         Right eye: No discharge. Left eye: No discharge. Extraocular Movements: Extraocular movements intact. Cardiovascular:      Rate and Rhythm: Normal rate. Pulses: Normal pulses. Abdominal:      Palpations: Abdomen is soft. Tenderness: There is no abdominal tenderness. There is no guarding or rebound. Musculoskeletal:         General: No swelling. Cervical back: Normal range of motion. Skin:     General: Skin is warm. Neurological:      General: No focal deficit present. Mental Status: He is alert. Psychiatric:         Mood and Affect: Mood normal.         ASSESSMENT & PLAN:    1. Morbid obesity due to excess calories ([address]  -1500 jamarcus / day  -Pt encouraged to continue exercising. Minimum goal of 150 minutes per week with ideal goal of 300 min per week. Exercise regimen should include at least 2-3 sessions per week of strength training. These recommendations are current with the most recent ASMBS guidelines. -Reviewed UDS / urine nicotine  -Submit file to insurance for approval    2. HTN  -Exforge 5-320 mg daily  -Lopressor 50 mg daily  -Hopefully will decrease / stop after bariatric surgery / weight loss    Call with any questions, concerns, or issues whatsoever.                 As of current visit, regarding obesity-related co-morbid conditions:  SOFIA [] compliant [] no longer using [] resolved per sleep study; hypertension [] medications; hyperlipidemia [] medications; GERD [] medications; DM [] insulin [] non-insulin [] no meds       Patient was encouraged to journal all food intake. Keep calorie level at approximately 1500, per discussion / plan with registered dietician. Protein intake is to be a minimum of 40-50 grams per day. Water drinking was encouraged with a goal of 64oz-128oz daily. Beverages are to be calorie free except for milk. Avoid soda and other carbonated beverages. Continue to increase level of physical activity. I spent 25 minutes with the patient face to face today and over 50% of the office visit today was spent in face to face counseling regarding diet and exercise, in preparation for his planned robotic sleeve gastrectomy. Discussed in length complying with the dietary recommendations, complying with the preoperative workup including dietary counseling , exercise physiologist counseling , and pre-operative optimization of pulmonologist  and cardiologist .    The patient expressed understanding and willingness to comply nicely; all questions and concerns addressed. No orders of the defined types were placed in this encounter. No orders of the defined types were placed in this encounter. Follow Up:  No follow-ups on file.     Kelsea Norwood MD

## 2021-10-18 ENCOUNTER — TELEPHONE (OUTPATIENT)
Dept: BARIATRICS/WEIGHT MGMT | Age: 46
End: 2021-10-18

## 2021-10-18 NOTE — TELEPHONE ENCOUNTER
Lincoln Dugan #[id number]     2/2/2022 thru 2/3/2022     CPT 26877     ICD 10 E66.01     [facility] 2/2/2022

## 2021-10-21 ENCOUNTER — TELEPHONE (OUTPATIENT)
Dept: BARIATRICS/WEIGHT MGMT | Age: 46
End: 2021-10-21

## 2021-10-21 NOTE — TELEPHONE ENCOUNTER
Scheduled Sleeve Gastrectomy at [facility]  Surgery: 12/1/21 at 945am.   Consent appt: 11/23/21 at 9am this will include ordered labs that must be done that day. PAT: by phone. Preop diet: 11/16/21 at 11. COVID: 11/26/21 at 745   Weigh in: 11/30/21 at 1115  P/O appt: 12/9/21 at 945am  Prep: NPO after midnight. Hold Blood thinners if taking any  Called and spoke with Eugene Stewart, gave dates and times.

## 2021-11-16 ENCOUNTER — OFFICE VISIT (OUTPATIENT)
Dept: BARIATRICS/WEIGHT MGMT | Age: 46
End: 2021-11-16

## 2021-11-16 VITALS — WEIGHT: 310.8 LBS | BODY MASS INDEX: 44.5 KG/M2 | HEIGHT: 70 IN

## 2021-11-16 DIAGNOSIS — E66.01 MORBID OBESITY WITH BMI OF 40.0-44.9, ADULT (HCC): Primary | ICD-10-CM

## 2021-11-16 PROCEDURE — 99999 PR OFFICE/OUTPT VISIT,PROCEDURE ONLY: CPT

## 2021-11-16 NOTE — PROGRESS NOTES
Outpatient Nutrition Counseling    REASON FOR VISIT: Pre-Op Diet Class    Chief Complaint:    Chief Complaint   Patient presents with    Weight Management       SUBJECTIVE:  Pt here to start 2 week liquid pre-op diet in preparation for Sleeve Gastrectomy 12/1. Pt instructed on 2 week liquid liver shrinking diet and complete post-op diet progression including tips for N/V, fluid/activity logs, tips for N/V, recipes and vitamins. Pt verbalized understanding to all info provided. The patient is a 40 y.o. male being seen for morbid obesity, considering weight loss surgery; Corbin's, Height: 5' 10\" (177.8 cm), Weight: (!) 310 lb 12.8 oz (141 kg), Current Body mass index is 44.6 kg/m². The patient's PCP is Melania Mota MD     Comorbid Conditions:  Significant diseases affecting this patient are   Past Medical History:   Diagnosis Date    COPD (chronic obstructive pulmonary disease) [facility]     see Dr Chidi Hill GERD (gastroesophageal reflux disease)     before gallbladder removed now it's ok    Hypertension     \"had since 2005- see Dr Bertha Elena\"they did a stress and echo after I came with chest pain 7/2014- not my heart the tests all clear- it was the gallbladder\"    Kidney stone     \"have a kidney stone now- have passed 2 or 3 in the past\"    PONV (postoperative nausea and vomiting)     PONV    Preop pulmonary/respiratory exam 4/16/2021    Short of breath on exertion     Sleep apnea     has sleep study 2014- use a c-pap machine for the past 8 weeks    Vertigo     \"noticed since I have been on the Topomax for weight loss\" No longer on topamax. .    Review of Systems - Review of Systems  Otherwise per HPI.     Allergies:  No Known Allergies    Past Surgical History:  Past Surgical History:   Procedure Laterality Date    CARPAL TUNNEL RELEASE Right 2012    CHOLECYSTECTOMY  9/9/14    COLONOSCOPY      CYSTOSCOPY Right 3/4/2019    CYSTOSCOPY RETROGRADE PYLEOGRAMS STONE MANIPULATION, with stent insertion performed by Satish Merchant MD at [facility], COLON, DIAGNOSTIC      EXTERNAL EAR SURGERY Right 2001    \"Swollen lymph node\"    HAND SURGERY Right 1999    Thumb reconstruction    HERNIA REPAIR Right last one 2013    inguinal x 5    TONSILLECTOMY AND ADENOIDECTOMY   age of 9 yrs    UMBILICAL HERNIA REPAIR  8/1/58    UMBILICAL HERNIA REPAIR  10/06/15    laparoscopic    UPPER GASTROINTESTINAL ENDOSCOPY N/A 6/8/2021    EGD BIOPSY performed by Aaliyah Pang MD at [facility] ENDOSCOPY       Family History:  Family History   Problem Relation Age of Onset    Other Mother         Factor Five    Cancer Maternal Grandmother         leukemia and Gallbladder CA    Cancer Paternal Grandmother         GI    Heart Disease Paternal Grandfather        Social History:  Social History     Socioeconomic History    Marital status:      Spouse name: Not on file    Number of children: Not on file    Years of education: Not on file    Highest education level: Not on file   Occupational History    Not on file   Tobacco Use    Smoking status: Never Smoker    Smokeless tobacco: Never Used   Vaping Use    Vaping Use: Never used   Substance and Sexual Activity    Alcohol use: No     Comment:         CAFFEINE: 1 16 oz per week     Drug use: No    Sexual activity: Yes     Partners: Female   Other Topics Concern    Not on file   Social History Narrative    Not on file     Social Determinants of Health     Financial Resource Strain:     Difficulty of Paying Living Expenses: Not on file   Food Insecurity:     Worried About Running Out of Food in the Last Year: Not on file    Herlinda of Food in the Last Year: Not on file   Transportation Needs:     Lack of Transportation (Medical): Not on file    Lack of Transportation (Non-Medical):  Not on file   Physical Activity:     Days of Exercise per Week: Not on file    Minutes of Exercise per Session: Not on file   Stress:     Feeling of Stress : Not on file Social Connections:     Frequency of Communication with Friends and Family: Not on file    Frequency of Social Gatherings with Friends and Family: Not on file    Attends Yazdanism Services: Not on file    Active Member of Clubs or Organizations: Not on file    Attends Club or Organization Meetings: Not on file    Marital Status: Not on file   Intimate Partner Violence:     Fear of Current or Ex-Partner: Not on file    Emotionally Abused: Not on file    Physically Abused: Not on file    Sexually Abused: Not on file   Housing Stability:     Unable to Pay for Housing in the Last Year: Not on file    Number of [place] in the Last Year: Not on file    Unstable Housing in the Last Year: Not on file         OBJECTIVE:  Physical Exam   Ht 5' 10\" (1.778 m)   Wt (!) 310 lb 12.8 oz (141 kg)   BMI 44.60 kg/m²        NUTRITION DIAGNOSIS: Overweight / Obesity   Problem: Increased adiposity compared to reference standard or established norms   Etiology: Excess intake compared to output over time   S/S: Ht: 70\" Wt: 310.8 lbs BMI: 44.6    NUTRITION INTERVENTIONS:    Individualized treatment goals to address nutritiondiagnosis:   Instructed on 800 kcal diet for weight loss post-op   Provided fluid/activity logs, recipe book and vitamins handout   Encouraged Physical activity as approved by physician    MONITORING/ EVALUATION/ PLAN:   Pt verbalized understanding of allmaterials covered   Pt asked pertinent questions throughout the session - expect compliance with nutrition guidelines presented   Provided pt with contact information should questions arise prior to next visit   Will f/u with pt in 2 weeks for weigh-in  Tracy Crespo MS, RDN, LD  11/16/2021

## 2021-11-17 DIAGNOSIS — Z01.818 PRE-OP TESTING: Primary | ICD-10-CM

## 2022-01-19 ENCOUNTER — TELEPHONE (OUTPATIENT)
Dept: BARIATRICS/WEIGHT MGMT | Age: 47
End: 2022-01-19

## 2022-05-24 ENCOUNTER — OFFICE VISIT (OUTPATIENT)
Dept: PULMONOLOGY | Age: 47
End: 2022-05-24
Payer: COMMERCIAL

## 2022-05-24 VITALS
DIASTOLIC BLOOD PRESSURE: 88 MMHG | HEART RATE: 60 BPM | WEIGHT: 315 LBS | BODY MASS INDEX: 45.1 KG/M2 | SYSTOLIC BLOOD PRESSURE: 126 MMHG | HEIGHT: 70 IN | OXYGEN SATURATION: 97 %

## 2022-05-24 DIAGNOSIS — E66.01 MORBID OBESITY WITH BMI OF 45.0-49.9, ADULT (HCC): ICD-10-CM

## 2022-05-24 DIAGNOSIS — G47.10 HYPERSOMNIA: ICD-10-CM

## 2022-05-24 DIAGNOSIS — G47.33 OSA (OBSTRUCTIVE SLEEP APNEA): ICD-10-CM

## 2022-05-24 PROCEDURE — 99203 OFFICE O/P NEW LOW 30 MIN: CPT | Performed by: INTERNAL MEDICINE

## 2022-05-24 ASSESSMENT — ENCOUNTER SYMPTOMS
BACK PAIN: 0
EYE DISCHARGE: 0
SHORTNESS OF BREATH: 0
EYE ITCHING: 0
ABDOMINAL DISTENTION: 0
ABDOMINAL PAIN: 0
COUGH: 0

## 2022-05-24 NOTE — PROGRESS NOTES
Red Sour  1975  Referring Provider: Venice Son MD    Subjective:     Chief Complaint   Patient presents with    Follow-up     Pt states his machine is sometimes to strong and there is a CPAP recall       HPI  Sachi Boyd is a 40 y.o. male has come back as a follow up. He has SOFIA on a BIPAP of 19/15 cm h20 which he is using it every night about 3 to 8 hours. He says that it is helping him. He has 60 lbs in the last 1 year. He has a FFM. He is occasionally tired. His 2 week download data showed that his residual AHI is 0.4 and leak 31.9 L/min    Current Outpatient Medications   Medication Sig Dispense Refill    lansoprazole (PREVACID) 30 MG delayed release capsule Take 1 capsule by mouth daily      amLODIPine-valsartan (EXFORGE) 5-320 MG per tablet Take 1 tablet by mouth daily      metoprolol tartrate (LOPRESSOR) 50 MG tablet Take 50 mg by mouth 2 times daily      PROVENTIL  (90 BASE) MCG/ACT inhaler Inhale 2 puffs into the lungs every 6 hours as needed for Wheezing. (Patient not taking: Reported on 10/12/2021) 1 Inhaler 3     No current facility-administered medications for this visit. No Known Allergies    Past Medical History:   Diagnosis Date    COPD (chronic obstructive pulmonary disease) ([facility])     see Dr Judge West GERD (gastroesophageal reflux disease)     before gallbladder removed now it's ok    Hypertension     \"had since 2005- see Dr Christa Elena\"they did a stress and echo after I came with chest pain 7/2014- not my heart the tests all clear- it was the gallbladder\"    Kidney stone     \"have a kidney stone now- have passed 2 or 3 in the past\"    PONV (postoperative nausea and vomiting)     PONV    Preop pulmonary/respiratory exam 4/16/2021    Short of breath on exertion     Sleep apnea     has sleep study 2014- use a c-pap machine for the past 8 weeks    Vertigo     \"noticed since I have been on the Topomax for weight loss\" No longer on topamax.        Past Surgical History:   Procedure Laterality Date    CARPAL TUNNEL RELEASE Right 2012    CHOLECYSTECTOMY  9/9/14    COLONOSCOPY      CYSTOSCOPY Right 3/4/2019    CYSTOSCOPY RETROGRADE PYLEOGRAMS STONE MANIPULATION, with stent insertion performed by Xander Batista MD at [address] [place] [address], [place], [facility]      EXTERNAL EAR SURGERY Right 2001    \"Swollen lymph node\"    HAND SURGERY Right 1999    Thumb reconstruction    HERNIA REPAIR Right last one 2013    inguinal x 5    TONSILLECTOMY AND ADENOIDECTOMY   age of 9 yrs    UMBILICAL HERNIA REPAIR  0/4/38    UMBILICAL HERNIA REPAIR  10/06/15    laparoscopic    UPPER GASTROINTESTINAL ENDOSCOPY N/A 6/8/2021    EGD BIOPSY performed by Kings Grvoe MD at [address] [place] [address] History     Socioeconomic History    Marital status:      Spouse name: None    Number of children: None    Years of education: None    Highest education level: None   Occupational History    None   Tobacco Use    Smoking status: Never Smoker    Smokeless tobacco: Never Used   Vaping Use    Vaping Use: Never used   Substance and Sexual Activity    Alcohol use: No     Comment:         CAFFEINE: 1 16 oz per week     Drug use: No    Sexual activity: Yes     Partners: Female   Other Topics Concern    None   Social History Narrative    None     Social Determinants of Health     Financial Resource Strain:     Difficulty of Paying Living Expenses: Not on file   Food Insecurity:     Worried About Running Out of Food in the Last Year: Not on file    Herlinda of Food in the Last Year: Not on file   Transportation Needs:     Lack of Transportation (Medical): Not on file    Lack of Transportation (Non-Medical):  Not on file   Physical Activity:     Days of Exercise per Week: Not on file    Minutes of Exercise per Session: Not on file   Stress:     Feeling of Stress : Not on file   Social Connections:     Frequency of Communication with Friends and Family: Not on file    Frequency of Social Gatherings with Friends and Family: Not on file    Attends Scientologist Services: Not on file    Active Member of Clubs or Organizations: Not on file    Attends Club or Organization Meetings: Not on file    Marital Status: Not on file   Intimate Partner Violence:     Fear of Current or Ex-Partner: Not on file    Emotionally Abused: Not on file    Physically Abused: Not on file    Sexually Abused: Not on file   Housing Stability:     Unable to Pay for Housing in the Last Year: Not on file    Number of [place] in the Last Year: Not on file    Unstable Housing in the Last Year: Not on file       Review of Systems   Constitutional: Negative for fatigue. HENT: Negative for congestion and postnasal drip. Eyes: Negative for discharge and itching. Respiratory: Negative for cough and shortness of breath. Cardiovascular: Negative for chest pain and leg swelling. Gastrointestinal: Negative for abdominal distention and abdominal pain. Endocrine: Negative for cold intolerance and heat intolerance. Genitourinary: Negative for enuresis and frequency. Musculoskeletal: Negative for arthralgias and back pain. Allergic/Immunologic: Negative for environmental allergies and food allergies. Neurological: Negative for light-headedness and headaches. Hematological: Negative for adenopathy. Psychiatric/Behavioral: Negative for agitation and behavioral problems. Objective:   /88   Pulse 60   Ht 5' 10\" (1.778 m)   Wt (!) 318 lb (144.2 kg)   SpO2 97%   BMI 45.63 kg/m²   Body mass index is 45.63 kg/m².   Sleep Medicine 1/29/2019 1/2/2019   Sitting and reading 1 2   Watching TV 1 2   Sitting, inactive in a public place (e.g. a theatre or a meeting) 0 1   As a passenger in a car for an hour without a break 0 1   Lying down to rest in the afternoon when circumstances permit 1 1   Sitting and talking to someone 0 0   Sitting quietly after a lunch without alcohol 0 0   In a car, while stopped for a few minutes in traffic 0 0   Total score 3 7   Neck circumference (Inches) - 19     Mallampati 3    Physical Exam  Vitals reviewed. Constitutional:       Appearance: Normal appearance. Comments: Morbid Obesity   HENT:      Head: Normocephalic and atraumatic. Nose: Nose normal.      Mouth/Throat:      Mouth: Mucous membranes are moist.   Eyes:      Extraocular Movements: Extraocular movements intact. Pupils: Pupils are equal, round, and reactive to light. Cardiovascular:      Rate and Rhythm: Normal rate and regular rhythm. Pulses: Normal pulses. Heart sounds: Normal heart sounds. Pulmonary:      Effort: Pulmonary effort is normal.      Breath sounds: Normal breath sounds. Abdominal:      General: Abdomen is flat. Palpations: Abdomen is soft. Musculoskeletal:         General: Normal range of motion. Cervical back: Normal range of motion and neck supple. Skin:     General: Skin is warm and dry. Neurological:      General: No focal deficit present. Mental Status: He is alert and oriented to person, place, and time. Psychiatric:         Mood and Affect: Mood normal.         Behavior: Behavior normal.         Radiology: None    Assessment and Plan     Problem List        Respiratory    SOFIA (obstructive sleep apnea)      Advised to be compliant with the BIPAP  Loose weight            Other    Hypersomnia      Advised to be compliant with the BIPAP   Loose weight         Morbid obesity with BMI of 45.0-49.9, adult (HCC)      Advised to loose weight with diet and excercise                    Follow-Up:    Return in about 1 year (around 5/24/2023) for 2 week download data.      Progress notes sent to the referring Provider    Coby Enriquez MD MD  5/24/2022  10:05 AM

## 2023-01-17 ENCOUNTER — TELEPHONE (OUTPATIENT)
Dept: PULMONOLOGY | Age: 48
End: 2023-01-17

## 2023-04-09 NOTE — PROGRESS NOTES
Outpatient Nutrition Counseling    REASON FOR VISIT: Initial Nutrition    Chief Complaint:    Chief Complaint   Patient presents with    Weight Management       SUBJECTIVE:  Pt was called to instruct on Initial Nutrition education. Pt was emailed all handouts and verbalized receipt. Pt was instructed on mindful eating, label reading, calorie healthy food choice and goal setting. The patient is a 41 y.o. male being seen for morbid obesity, considering weight loss surgery; Corbin's, Height: 5' 9\" (175.3 cm), Weight: (!) 324 lb (147 kg), Current Body mass index is 47.85 kg/m². The patient's PCP is Stone Molina MD   The patient first recognized that he had a weight problem about 15 years ago. The patient's lowest weight in the last five years was 300 lbs, and the highest weight in the last five years was 354 lbs. Weight Loss Program History:  The patient states he has tried calorie counting. The most weight lost ever with diet and exercise was 30 Lbs, but unfortunately only kept them of for a few months. Corbin's life is significantlyaffected by weight related to his co-morbidities. Comorbid Conditions:  Significant diseases affecting this patient are   Past Medical History:   Diagnosis Date    COPD (chronic obstructive pulmonary disease) ([facility] [address]     see Dr Francis Kam GERD (gastroesophageal reflux disease)     before gallbladder removed now it's ok    Hypertension     \"had since 2005- see Dr Honorio Elena\"they did a stress and echo after I came with chest pain 7/2014- not my heart the tests all clear- it was the gallbladder\"    Kidney stone     \"have a kidney stone now- have passed 2 or 3 in the past\"    PONV (postoperative nausea and vomiting)     PONV    Short of breath on exertion     Sleep apnea     has sleep study 2014- use a c-pap machine for the past 8 weeks    Vertigo     \"noticed since I have been on the Topomax for weight loss\" No longer on topamax.    .    Review of Systems - Review of Systems  Otherwise per HPI. Allergies:  No Known Allergies    Past Surgical History:  Past Surgical History:   Procedure Laterality Date    CARPAL TUNNEL RELEASE Right 2012    CHOLECYSTECTOMY  9/9/14    CYSTOSCOPY Right 3/4/2019    CYSTOSCOPY RETROGRADE PYLEOGRAMS STONE MANIPULATION, with stent insertion performed by Sofi Simmons MD at [address] Right 2001    \"Swollen lymph node\"    HAND SURGERY Right 1999    Thumb reconstruction    HERNIA REPAIR Right last one 2013    inguinal x 5    TONSILLECTOMY AND ADENOIDECTOMY   age of 9 yrs    UMBILICAL HERNIA REPAIR  7/9/43    UMBILICAL HERNIA REPAIR  10/06/15    laparoscopic       Family History:  Family History   Problem Relation Age of Onset    Other Mother         Factor Five    Cancer Maternal Grandmother         leukemia and Gallbladder CA    Cancer Paternal Grandmother         GI    Heart Disease Paternal Grandfather        Social History:  Social History     Socioeconomic History    Marital status:      Spouse name: Not on file    Number of children: Not on file    Years of education: Not on file    Highest education level: Not on file   Occupational History    Not on file   Social Needs    Financial resource strain: Not on file    Food insecurity     Worry: Not on file     Inability: Not on file    Transportation needs     Medical: Not on file     Non-medical: Not on file   Tobacco Use    Smoking status: Never Smoker    Smokeless tobacco: Never Used   Substance and Sexual Activity    Alcohol use: No     Comment:         CAFFEINE: 48 oz [employer] daily.     Drug use: No    Sexual activity: Not on file   Lifestyle    Physical activity     Days per week: Not on file     Minutes per session: Not on file    Stress: Not on file   Relationships    Social connections     Talks on phone: Not on file     Gets together: Not on file     Attends Protestant service: Not on file     Active member of club or Intro Statement (Will Not Render If Left Blank): The patient is undergoing superficial radiation therapy for skin cancer and presents for weekly evaluation and management.  Per protocol and as documented on the flow sheet, the patient was questioned as to subjective redness, pruritus, pain, drainage, fatigue, or any other symptoms.  Objectively, the radiation area was evaluated with regards to erythema, atrophy, scale, crusting, erosion, ulceration, edema, purpura, tenderness, warmth, drainage, and any other findings.  The plan was extensively reviewed including the dose, and dosing schedule.  The simulation and clinical setup was also reviewed as was the external and any internal shields and based on this review the appropriateness and sufficiency of treatment was determined.

## 2023-05-16 ENCOUNTER — OFFICE VISIT (OUTPATIENT)
Dept: PULMONOLOGY | Age: 48
End: 2023-05-16
Payer: COMMERCIAL

## 2023-05-16 VITALS
HEIGHT: 70 IN | WEIGHT: 300 LBS | BODY MASS INDEX: 42.95 KG/M2 | OXYGEN SATURATION: 99 % | RESPIRATION RATE: 16 BRPM | HEART RATE: 65 BPM

## 2023-05-16 DIAGNOSIS — E66.01 MORBID OBESITY WITH BMI OF 40.0-44.9, ADULT (HCC): ICD-10-CM

## 2023-05-16 DIAGNOSIS — G47.10 HYPERSOMNIA: ICD-10-CM

## 2023-05-16 DIAGNOSIS — G47.33 OSA (OBSTRUCTIVE SLEEP APNEA): ICD-10-CM

## 2023-05-16 PROCEDURE — 99214 OFFICE O/P EST MOD 30 MIN: CPT | Performed by: INTERNAL MEDICINE

## 2023-05-16 ASSESSMENT — ENCOUNTER SYMPTOMS
BACK PAIN: 0
SHORTNESS OF BREATH: 0
EYE DISCHARGE: 0
ABDOMINAL DISTENTION: 0
EYE ITCHING: 0
COUGH: 0
ABDOMINAL PAIN: 0

## 2023-05-16 NOTE — PROGRESS NOTES
Dale Granados  1975  Referring Provider: Eladio Freeman MD    Subjective:     Chief Complaint   Patient presents with    1 Year Follow Up    Sleep Apnea     Compliance check- patient brought [employer] SD card into office, unable to download. Patient was using [facility] but would like to go somewhere else prefers [address]. [facility] sent him supplies that he never needed pt reports       HPI  Sofia Alonso is a 56 y.o. male has come back as a follow up. He has SOFIA with EDS on a BIPAP 19/15 cm h20 which he is using it every night about 6 hours. He says that it is helping him. He has no loss of weight. He is not sleepy or tired during the day time. He has a FFM. Current Outpatient Medications   Medication Sig Dispense Refill    lansoprazole (PREVACID) 30 MG delayed release capsule Take 1 capsule by mouth daily      amLODIPine-valsartan (EXFORGE) 5-320 MG per tablet Take 1 tablet by mouth daily      metoprolol tartrate (LOPRESSOR) 50 MG tablet Take 1 tablet by mouth 2 times daily      PROVENTIL  (90 BASE) MCG/ACT inhaler Inhale 2 puffs into the lungs every 6 hours as needed for Wheezing. (Patient not taking: Reported on 5/16/2023) 1 Inhaler 3     No current facility-administered medications for this visit.        No Known Allergies    Past Medical History:   Diagnosis Date    COPD (chronic obstructive pulmonary disease) (HCC)     see Dr Sebastien Roblero    GERD (gastroesophageal reflux disease)     before gallbladder removed now it's ok    Hypertension     \"had since 2005- see Dr Noemy Elena\"they did a stress and echo after I came with chest pain 7/2014- not my heart the tests all clear- it was the gallbladder\"    Kidney stone     \"have a kidney stone now- have passed 2 or 3 in the past\"    PONV (postoperative nausea and vomiting)     PONV    Preop pulmonary/respiratory exam 4/16/2021    Short of breath on exertion     Sleep apnea     has sleep study 2014- use a c-pap machine for the past 8 weeks    Vertigo

## 2023-06-07 ENCOUNTER — TELEPHONE (OUTPATIENT)
Dept: PULMONOLOGY | Age: 48
End: 2023-06-07

## 2023-06-07 NOTE — TELEPHONE ENCOUNTER
[email or web address] being in bad debit.

## 2024-05-17 ENCOUNTER — OFFICE VISIT (OUTPATIENT)
Dept: PULMONOLOGY | Age: 49
End: 2024-05-17
Payer: COMMERCIAL

## 2024-05-17 VITALS — OXYGEN SATURATION: 97 % | HEIGHT: 70 IN | WEIGHT: 315 LBS | BODY MASS INDEX: 45.1 KG/M2 | HEART RATE: 76 BPM

## 2024-05-17 DIAGNOSIS — G47.33 OSA (OBSTRUCTIVE SLEEP APNEA): Primary | ICD-10-CM

## 2024-05-17 DIAGNOSIS — G47.10 HYPERSOMNIA: ICD-10-CM

## 2024-05-17 DIAGNOSIS — E66.01 MORBID OBESITY WITH BMI OF 45.0-49.9, ADULT (HCC): ICD-10-CM

## 2024-05-17 PROCEDURE — 99214 OFFICE O/P EST MOD 30 MIN: CPT | Performed by: INTERNAL MEDICINE

## 2024-05-17 RX ORDER — AMLODIPINE AND VALSARTAN 10; 320 MG/1; MG/1
1 TABLET ORAL DAILY
COMMUNITY
Start: 2024-05-02

## 2024-05-17 RX ORDER — OMEPRAZOLE 40 MG/1
40 CAPSULE, DELAYED RELEASE ORAL DAILY
COMMUNITY

## 2024-05-17 ASSESSMENT — ENCOUNTER SYMPTOMS
EYE DISCHARGE: 0
BACK PAIN: 0
SHORTNESS OF BREATH: 0
ABDOMINAL PAIN: 0
COUGH: 0
ABDOMINAL DISTENTION: 0
EYE ITCHING: 0

## 2024-05-17 ASSESSMENT — SLEEP AND FATIGUE QUESTIONNAIRES
HOW LIKELY ARE YOU TO NOD OFF OR FALL ASLEEP WHILE LYING DOWN TO REST IN THE AFTERNOON WHEN CIRCUMSTANCES PERMIT: HIGH CHANCE OF DOZING
HOW LIKELY ARE YOU TO NOD OFF OR FALL ASLEEP WHILE WATCHING TV: MODERATE CHANCE OF DOZING
ESS TOTAL SCORE: 9
HOW LIKELY ARE YOU TO NOD OFF OR FALL ASLEEP WHILE SITTING QUIETLY AFTER LUNCH WITHOUT ALCOHOL: MODERATE CHANCE OF DOZING
HOW LIKELY ARE YOU TO NOD OFF OR FALL ASLEEP WHILE SITTING AND READING: MODERATE CHANCE OF DOZING
HOW LIKELY ARE YOU TO NOD OFF OR FALL ASLEEP WHILE SITTING INACTIVE IN A PUBLIC PLACE: WOULD NEVER DOZE
HOW LIKELY ARE YOU TO NOD OFF OR FALL ASLEEP WHEN YOU ARE A PASSENGER IN A CAR FOR AN HOUR WITHOUT A BREAK: WOULD NEVER DOZE
HOW LIKELY ARE YOU TO NOD OFF OR FALL ASLEEP WHILE SITTING AND TALKING TO SOMEONE: WOULD NEVER DOZE
HOW LIKELY ARE YOU TO NOD OFF OR FALL ASLEEP IN A CAR, WHILE STOPPED FOR A FEW MINUTES IN TRAFFIC: WOULD NEVER DOZE
NECK CIRCUMFERENCE (INCHES): 20

## 2024-05-17 NOTE — ASSESSMENT & PLAN NOTE
He is on the BIPAP  Gained 30 lbs in the last 1 year  Advised BIPAP retitration study  Loose weight

## 2024-05-17 NOTE — PROGRESS NOTES
Corbin Camp  1975  Referring Provider: José Miguel Kurtz [facility] - General     Subjective:     Chief Complaint   Patient presents with    Sleep Apnea     Do not have compliance report       HPI  Corbin is a 48 y.o. male has come back as a follow up. He has SOFIA with EDS on a BIPAP 19/15 cm h20 which he is using it every night about 6 to  hours. He says that it is helping him. He has 30 lb weight gain He is sleepy and tired during the day time.He has a FFM.      Current Outpatient Medications   Medication Sig Dispense Refill    omeprazole (PRILOSEC) 40 MG delayed release capsule Take 1 capsule by mouth daily      amLODIPine-valsartan (EXFORGE)  MG per tablet Take 1 tablet by mouth daily      metoprolol tartrate (LOPRESSOR) 50 MG tablet Take 1 tablet by mouth 2 times daily       No current facility-administered medications for this visit.       No Known Allergies    Past Medical History:   Diagnosis Date    COPD (chronic obstructive pulmonary disease) ([facility])     see Dr Akins    GERD (gastroesophageal reflux disease)     before gallbladder removed now it's ok    Hypertension     \"had since 2005- see Dr Cristian Elena\"they did a stress and echo after I came with chest pain 7/2014- not my heart the tests all clear- it was the gallbladder\"    Kidney stone     \"have a kidney stone now- have passed 2 or 3 in the past\"    PONV (postoperative nausea and vomiting)     PONV    Preop pulmonary/respiratory exam 4/16/2021    Short of breath on exertion     Sleep apnea     has sleep study 2014- use a c-pap machine for the past 8 weeks    Vertigo     \"noticed since I have been on the Topomax for weight loss\" No longer on topamax.       Past Surgical History:   Procedure Laterality Date    CARPAL TUNNEL RELEASE Right 2012    CHOLECYSTECTOMY  9/9/14    COLONOSCOPY      CYSTOSCOPY Right 3/4/2019    CYSTOSCOPY RETROGRADE PYLEOGRAMS STONE MANIPULATION, with stent insertion performed by Salvador Cruz MD at [facility] OR

## 2024-06-04 ENCOUNTER — HOSPITAL ENCOUNTER (OUTPATIENT)
Dept: SLEEP CENTER | Age: 49
Discharge: HOME | End: 2024-06-04
Payer: COMMERCIAL

## 2024-06-04 VITALS — WEIGHT: 315 LBS | HEIGHT: 70 IN | BODY MASS INDEX: 45.1 KG/M2

## 2024-06-04 DIAGNOSIS — G47.33 OSA (OBSTRUCTIVE SLEEP APNEA): ICD-10-CM

## 2024-06-04 PROCEDURE — 95811 POLYSOM 6/>YRS CPAP 4/> PARM: CPT

## 2024-06-05 NOTE — PROGRESS NOTES
6/5/2024  sleep study  for Corbin Camp  1975 is complete.      Results are pending physician review.    Electronically signed by Yoel Butler on 6/5/2024 at 3:48 AM

## 2024-06-11 ENCOUNTER — OFFICE VISIT (OUTPATIENT)
Dept: PULMONOLOGY | Age: 49
End: 2024-06-11
Payer: COMMERCIAL

## 2024-06-11 VITALS
HEART RATE: 99 BPM | WEIGHT: 315 LBS | BODY MASS INDEX: 45.1 KG/M2 | OXYGEN SATURATION: 97 % | HEIGHT: 70 IN | DIASTOLIC BLOOD PRESSURE: 82 MMHG | SYSTOLIC BLOOD PRESSURE: 142 MMHG

## 2024-06-11 DIAGNOSIS — G47.33 OSA (OBSTRUCTIVE SLEEP APNEA): Primary | ICD-10-CM

## 2024-06-11 DIAGNOSIS — E66.01 MORBID OBESITY WITH BMI OF 45.0-49.9, ADULT (HCC): ICD-10-CM

## 2024-06-11 DIAGNOSIS — R06.02 SOB (SHORTNESS OF BREATH) ON EXERTION: ICD-10-CM

## 2024-06-11 DIAGNOSIS — G47.10 HYPERSOMNIA: ICD-10-CM

## 2024-06-11 PROCEDURE — 99214 OFFICE O/P EST MOD 30 MIN: CPT | Performed by: INTERNAL MEDICINE

## 2024-06-11 ASSESSMENT — ENCOUNTER SYMPTOMS
SHORTNESS OF BREATH: 0
ABDOMINAL PAIN: 0
BACK PAIN: 0
ABDOMINAL DISTENTION: 0
COUGH: 0
EYE DISCHARGE: 0
EYE ITCHING: 0

## 2024-06-11 NOTE — PROGRESS NOTES
obesity with BMI of 45.0-49.9, adult (HCC)      Advised to loose weight with diet and exercise                  Total time spent for this encounter: 35 mins    Follow-Up:    Return in about 2 months (around 8/11/2024) for 2 week download data.     Progress notes sent to the referring Provider    Wade Banks MD MD  6/11/2024  2:08 PM

## 2024-08-09 ENCOUNTER — OFFICE VISIT (OUTPATIENT)
Dept: PULMONOLOGY | Age: 49
End: 2024-08-09
Payer: COMMERCIAL

## 2024-08-09 VITALS
DIASTOLIC BLOOD PRESSURE: 82 MMHG | SYSTOLIC BLOOD PRESSURE: 122 MMHG | HEART RATE: 75 BPM | BODY MASS INDEX: 45.1 KG/M2 | WEIGHT: 315 LBS | OXYGEN SATURATION: 95 % | HEIGHT: 70 IN

## 2024-08-09 DIAGNOSIS — G47.33 OSA (OBSTRUCTIVE SLEEP APNEA): Primary | ICD-10-CM

## 2024-08-09 DIAGNOSIS — G47.10 HYPERSOMNIA: ICD-10-CM

## 2024-08-09 DIAGNOSIS — E66.01 MORBID OBESITY WITH BMI OF 45.0-49.9, ADULT (HCC): ICD-10-CM

## 2024-08-09 PROCEDURE — 99214 OFFICE O/P EST MOD 30 MIN: CPT | Performed by: INTERNAL MEDICINE

## 2024-08-09 ASSESSMENT — ENCOUNTER SYMPTOMS
EYE DISCHARGE: 0
ABDOMINAL DISTENTION: 0
COUGH: 0
SHORTNESS OF BREATH: 0
EYE ITCHING: 0
ABDOMINAL PAIN: 0
BACK PAIN: 0

## 2024-08-09 NOTE — PROGRESS NOTES
Corbin Camp  1975  [facility]     Subjective:     Chief Complaint   Patient presents with    Follow-up     Compliance Check       HPI  Corbin is a 48 y.o. male has come back as a follow up. He has SOFIA with EDS. He is on a BIPAP of 18/12 cm h20 which he is using it every night about 6 to 8 hours. He says that it is helping him.He has no loss of weight. He is not sleepy during the day time.He has a FFM. His 2 week download data showed a residual AHI is 1.0 and leak is 19.9 L/min.    Current Outpatient Medications   Medication Sig Dispense Refill    omeprazole (PRILOSEC) 40 MG delayed release capsule Take 1 capsule by mouth daily      amLODIPine-valsartan (EXFORGE)  MG per tablet Take 1 tablet by mouth daily      metoprolol tartrate (LOPRESSOR) 50 MG tablet Take 1 tablet by mouth 2 times daily       No current facility-administered medications for this visit.       No Known Allergies    Past Medical History:   Diagnosis Date    COPD (chronic obstructive pulmonary disease) ([facility])     see Dr Akins    GERD (gastroesophageal reflux disease)     before gallbladder removed now it's ok    Hypertension     \"had since 2005- see Dr Cristian Elena\"they did a stress and echo after I came with chest pain 7/2014- not my heart the tests all clear- it was the gallbladder\"    Kidney stone     \"have a kidney stone now- have passed 2 or 3 in the past\"    PONV (postoperative nausea and vomiting)     PONV    Preop pulmonary/respiratory exam 4/16/2021    Short of breath on exertion     Sleep apnea     has sleep study 2014- use a c-pap machine for the past 8 weeks    Vertigo     \"noticed since I have been on the Topomax for weight loss\" No longer on topamax.       Past Surgical History:   Procedure Laterality Date    CARPAL TUNNEL RELEASE Right 2012    CHOLECYSTECTOMY  9/9/14    COLONOSCOPY      CYSTOSCOPY Right 3/4/2019    CYSTOSCOPY RETROGRADE PYLEOGRAMS STONE MANIPULATION, with

## 2025-05-14 DIAGNOSIS — M25.511 RIGHT SHOULDER PAIN, UNSPECIFIED CHRONICITY: Primary | ICD-10-CM

## 2025-05-15 ENCOUNTER — OFFICE VISIT (OUTPATIENT)
Dept: ORTHOPEDIC SURGERY | Age: 50
End: 2025-05-15
Payer: COMMERCIAL

## 2025-05-15 ENCOUNTER — HOSPITAL ENCOUNTER (OUTPATIENT)
Dept: GENERAL RADIOLOGY | Age: 50
Discharge: HOME | End: 2025-05-15
Payer: COMMERCIAL

## 2025-05-15 VITALS
HEIGHT: 70 IN | HEART RATE: 78 BPM | RESPIRATION RATE: 14 BRPM | WEIGHT: 315 LBS | OXYGEN SATURATION: 97 % | BODY MASS INDEX: 45.1 KG/M2

## 2025-05-15 DIAGNOSIS — M75.21 BICEPS TENDINITIS, RIGHT: Primary | ICD-10-CM

## 2025-05-15 DIAGNOSIS — M25.511 RIGHT SHOULDER PAIN, UNSPECIFIED CHRONICITY: ICD-10-CM

## 2025-05-15 PROCEDURE — 99203 OFFICE O/P NEW LOW 30 MIN: CPT | Performed by: PHYSICIAN ASSISTANT

## 2025-05-15 PROCEDURE — 73030 X-RAY EXAM OF SHOULDER: CPT

## 2025-05-15 PROCEDURE — 20610 DRAIN/INJ JOINT/BURSA W/O US: CPT | Performed by: PHYSICIAN ASSISTANT

## 2025-05-15 RX ORDER — TRIAMCINOLONE ACETONIDE 40 MG/ML
40 INJECTION, SUSPENSION INTRA-ARTICULAR; INTRAMUSCULAR ONCE
Status: COMPLETED | OUTPATIENT
Start: 2025-05-15 | End: 2025-05-15

## 2025-05-15 RX ADMIN — TRIAMCINOLONE ACETONIDE 40 MG: 40 INJECTION, SUSPENSION INTRA-ARTICULAR; INTRAMUSCULAR at 13:49

## 2025-05-15 SDOH — HEALTH STABILITY: PHYSICAL HEALTH: ON AVERAGE, HOW MANY MINUTES DO YOU ENGAGE IN EXERCISE AT THIS LEVEL?: 30 MIN

## 2025-05-15 SDOH — HEALTH STABILITY: PHYSICAL HEALTH: ON AVERAGE, HOW MANY DAYS PER WEEK DO YOU ENGAGE IN MODERATE TO STRENUOUS EXERCISE (LIKE A BRISK WALK)?: 3 DAYS

## 2025-05-15 NOTE — PATIENT INSTRUCTIONS
Continue weight-bearing as tolerated.  Ice and elevate as needed.  Tylenol or Motrin for pain.  Injection given into the right shoulder  Please still complete the x-ray at the [facility].  Call office in two weeks if no better we will order an MRI.    We are committed to providing you the best care possible.  If you receive a survey after visiting one of our offices, please take time to share your experience concerning your physician office visit.  These surveys are confidential and no health information about you is shared.  We are eager to improve for you and we are counting on your feedback to help make that happen.

## 2025-05-15 NOTE — PROGRESS NOTES
[facility]      HPI:  Corbin Camp is a 49 y.o. male that complains of right shoulder pain within the bicep anteriorly that radiates into the muscle.  States that he has been having this pain for several months.  He rates the pain at a 6/10.  Pain is aching and throbbing in nature.      I reviewed and agree with the portions of the HPI, review of systems, vital documentation and plan performed by my staff and have added/addended where appropriate.     The patient was referred by José Miguel Kurtz MD for evaluation of right shoulder pain..    Past Medical History:   Diagnosis Date    COPD (chronic obstructive pulmonary disease) ([facility])     see Dr Akins    GERD (gastroesophageal reflux disease)     before gallbladder removed now it's ok    Hypertension     \"had since 2005- see Dr Cristian Elena\"they did a stress and echo after I came with chest pain 7/2014- not my heart the tests all clear- it was the gallbladder\"    Kidney stone     \"have a kidney stone now- have passed 2 or 3 in the past\"    PONV (postoperative nausea and vomiting)     PONV    Preop pulmonary/respiratory exam 4/16/2021    Short of breath on exertion     Sleep apnea     has sleep study 2014- use a c-pap machine for the past 8 weeks    Vertigo     \"noticed since I have been on the Topomax for weight loss\" No longer on topamax.       Past Surgical History:   Procedure Laterality Date    CARPAL TUNNEL RELEASE Right 2012    CHOLECYSTECTOMY  9/9/14    COLONOSCOPY      CYSTOSCOPY Right 3/4/2019    CYSTOSCOPY RETROGRADE PYLEOGRAMS STONE MANIPULATION, with stent insertion performed by Salvador [facility] MD at [facility] OR    ENDOSCOPY, COLON, DIAGNOSTIC      EXTERNAL EAR SURGERY Right 2001    \"Swollen lymph node\"    HAND SURGERY Right 1999    Thumb reconstruction    HERNIA REPAIR Right last one 2013    inguinal x 5    TONSILLECTOMY AND ADENOIDECTOMY   age of 11 yrs    UMBILICAL HERNIA REPAIR  9/9/14    UMBILICAL HERNIA REPAIR

## 2025-05-15 NOTE — PROGRESS NOTES
Patient is in the office to discuss the right shoulder pain.    Corbin Camp is a 49 y.o. year old male who complains of Right shoulder pain, stiffness and weakness Patient states that he has been having pain since February, but here in the last 1-2 weeks the pain has increased in the rotator cuff and proximal bicep area. Patient was seen by Dr. Kurtz that attempted to order an MRI of the right shoulder and was denied by insurance. NO x-rays during this time.

## 2025-05-28 ASSESSMENT — ENCOUNTER SYMPTOMS
RESPIRATORY NEGATIVE: 1
GASTROINTESTINAL NEGATIVE: 1
EYES NEGATIVE: 1

## 2025-06-03 ENCOUNTER — TELEPHONE (OUTPATIENT)
Dept: ORTHOPEDIC SURGERY | Age: 50
End: 2025-06-03

## 2025-06-03 DIAGNOSIS — M75.21 BICEPS TENDINITIS, RIGHT: Primary | ICD-10-CM

## 2025-06-23 ENCOUNTER — OFFICE VISIT (OUTPATIENT)
Dept: ORTHOPEDIC SURGERY | Age: 50
End: 2025-06-23
Payer: COMMERCIAL

## 2025-06-23 VITALS — OXYGEN SATURATION: 98 % | HEIGHT: 70 IN | HEART RATE: 76 BPM | BODY MASS INDEX: 46.92 KG/M2 | RESPIRATION RATE: 16 BRPM

## 2025-06-23 DIAGNOSIS — M75.21 BICEPS TENDINITIS, RIGHT: Primary | ICD-10-CM

## 2025-06-23 DIAGNOSIS — M75.111 INCOMPLETE TEAR OF RIGHT ROTATOR CUFF, UNSPECIFIED WHETHER TRAUMATIC: ICD-10-CM

## 2025-06-23 PROCEDURE — 99211 OFF/OP EST MAY X REQ PHY/QHP: CPT | Performed by: PHYSICIAN ASSISTANT

## 2025-06-23 RX ORDER — IBUPROFEN 800 MG/1
TABLET, FILM COATED ORAL
COMMUNITY
Start: 2024-09-06

## 2025-06-23 RX ORDER — GABAPENTIN 300 MG/1
CAPSULE ORAL
COMMUNITY
Start: 2024-12-03

## 2025-06-23 ASSESSMENT — ENCOUNTER SYMPTOMS
RESPIRATORY NEGATIVE: 1
GASTROINTESTINAL NEGATIVE: 1
EYES NEGATIVE: 1

## 2025-06-23 NOTE — PATIENT INSTRUCTIONS
Follow up with Dr. Mota.  Please return with the disc with your MRI imaging on it and he will discuss potential options.

## 2025-06-23 NOTE — PROGRESS NOTES
Patient returns to the office following a right shoulder injection. Pt stated the injection helped for about 5 days and the pain returned. Pt stated the pain is about a 5/10 today and worsens with any movement. Pt had an MRI with proscan.   
(ADVIL;MOTRIN) 800 MG tablet       omeprazole (PRILOSEC) 40 MG delayed release capsule Take 1 capsule by mouth daily      amLODIPine-valsartan (EXFORGE)  MG per tablet Take 1 tablet by mouth daily      metoprolol tartrate (LOPRESSOR) 50 MG tablet Take 1 tablet by mouth 2 times daily       No current facility-administered medications for this visit.       No Known Allergies    Vitals:    06/23/25 0801   Pulse: 76   Resp: 16   SpO2: 98%   Height: 1.778 m (5' 10\")         Review of Systems:   Review of Systems   Constitutional: Negative.    HENT: Negative.     Eyes: Negative.    Respiratory: Negative.     Cardiovascular: Negative.    Gastrointestinal: Negative.    Genitourinary: Negative.    Musculoskeletal:  Positive for arthralgias and myalgias.   Skin: Negative.  Negative for rash and wound.   Neurological: Negative.    Psychiatric/Behavioral: Negative.            Physical Exam:   Gen/Psych:Examination reveals a pleasant individual in no acute distress. The patient is oriented to time, place and person.  The patient's mood and affect are appropriate.  Patient appears well nourished. Body habitus is Obese    HEENT: Head is atraumatic normocephalic, ears are symmetric, eyes show equal pupils bilaterally, extraocular muscles intact. Hearing is obese. Nares are patent bilaterally, no epistaxis, no rhinorrhea.     Lymph: No obvious lymphedema in bilateral upper extremities     Skin: Intact in bilateral upper extremities with no ulcerations, lesions, rash, erythema.     Vascular: There are no varicosities in bilateral upper  extremities, sensation intact to light touch over bilateral upper extremities.    Musculoskeletal:  Right shoulder exam:  Skin:  Clear with no erythema, there is no significant joint effusion  Deformity: None  Atrophy:  none  Tenderness: Diffuse  Active ROM:   FE: 180    IR side: L4           ER side: 50   Ad/Abduction: 180      Moderate tenderness in the bicipital groove and proximal

## 2025-06-30 ENCOUNTER — PREP FOR PROCEDURE (OUTPATIENT)
Dept: ORTHOPEDIC SURGERY | Age: 50
End: 2025-06-30

## 2025-06-30 ENCOUNTER — TELEPHONE (OUTPATIENT)
Dept: ORTHOPEDIC SURGERY | Age: 50
End: 2025-06-30

## 2025-06-30 ENCOUNTER — OFFICE VISIT (OUTPATIENT)
Dept: ORTHOPEDIC SURGERY | Age: 50
End: 2025-06-30
Payer: COMMERCIAL

## 2025-06-30 VITALS — WEIGHT: 315 LBS | HEART RATE: 88 BPM | BODY MASS INDEX: 48.07 KG/M2 | OXYGEN SATURATION: 98 %

## 2025-06-30 DIAGNOSIS — Z01.818 PREOP EXAMINATION: Primary | ICD-10-CM

## 2025-06-30 DIAGNOSIS — G56.21 CUBITAL TUNNEL SYNDROME ON RIGHT: ICD-10-CM

## 2025-06-30 DIAGNOSIS — M75.41 IMPINGEMENT SYNDROME OF RIGHT SHOULDER: ICD-10-CM

## 2025-06-30 DIAGNOSIS — M75.111 INCOMPLETE TEAR OF RIGHT ROTATOR CUFF, UNSPECIFIED WHETHER TRAUMATIC: Primary | ICD-10-CM

## 2025-06-30 DIAGNOSIS — M19.011 ARTHRITIS OF RIGHT SHOULDER REGION: ICD-10-CM

## 2025-06-30 DIAGNOSIS — M75.21 BICIPITAL TENDINITIS OF RIGHT SHOULDER: ICD-10-CM

## 2025-06-30 PROCEDURE — 99204 OFFICE O/P NEW MOD 45 MIN: CPT | Performed by: STUDENT IN AN ORGANIZED HEALTH CARE EDUCATION/TRAINING PROGRAM

## 2025-06-30 RX ORDER — ACETAMINOPHEN 325 MG/1
1000 TABLET ORAL ONCE
Status: CANCELLED | OUTPATIENT
Start: 2025-06-30 | End: 2025-06-30

## 2025-06-30 RX ORDER — SODIUM CHLORIDE 0.9 % (FLUSH) 0.9 %
5-40 SYRINGE (ML) INJECTION EVERY 12 HOURS SCHEDULED
Status: CANCELLED | OUTPATIENT
Start: 2025-06-30

## 2025-06-30 RX ORDER — CELECOXIB 200 MG/1
200 CAPSULE ORAL 2 TIMES DAILY
COMMUNITY

## 2025-06-30 RX ORDER — SODIUM CHLORIDE 9 MG/ML
INJECTION, SOLUTION INTRAVENOUS PRN
Status: CANCELLED | OUTPATIENT
Start: 2025-06-30

## 2025-06-30 RX ORDER — SODIUM CHLORIDE, SODIUM LACTATE, POTASSIUM CHLORIDE, CALCIUM CHLORIDE 600; 310; 30; 20 MG/100ML; MG/100ML; MG/100ML; MG/100ML
INJECTION, SOLUTION INTRAVENOUS CONTINUOUS
Status: CANCELLED | OUTPATIENT
Start: 2025-06-30

## 2025-06-30 RX ORDER — SODIUM CHLORIDE 0.9 % (FLUSH) 0.9 %
5-40 SYRINGE (ML) INJECTION PRN
Status: CANCELLED | OUTPATIENT
Start: 2025-06-30

## 2025-06-30 ASSESSMENT — ENCOUNTER SYMPTOMS
SORE THROAT: 0
BACK PAIN: 0
WHEEZING: 0
DIARRHEA: 0
VOMITING: 0
COUGH: 0
SHORTNESS OF BREATH: 0
NAUSEA: 0
COLOR CHANGE: 0

## 2025-06-30 NOTE — PROGRESS NOTES
MRI Complete    IMPRESSION:   Mild cuff peritendinitis with interstitial laminar fraying tendinous insertion of the supraspinatus. Partial-thickness tearing supraspinatus anteriorly at the footplate measuring 8 x 9 mm. With penetrating erosion and pseudocyst formation at the humeral head insertion site (rim rent tear).   Moderate acromioclavicular joint arthrosis and osseous spurring with capsular inflammation and erosions of the distal clavicle, likely overuse phenomena.   Biceps tendinitis and peritendinitis without macro tear.

## 2025-06-30 NOTE — PROGRESS NOTES
6/30/2025   Chief Complaint   Patient presents with    Shoulder Pain     Rotator cuff tear per MRI        History of Present Illness:                             Corbin Camp is a 49 y.o. male   Right shoulder MRI Complete     IMPRESSION:   Mild cuff peritendinitis with interstitial laminar fraying tendinous insertion of the supraspinatus. Partial-thickness tearing supraspinatus anteriorly at the footplate measuring 8 x 9 mm. With penetrating erosion and pseudocyst formation at the humeral head insertion site (rim rent tear).   Moderate acromioclavicular joint arthrosis and osseous spurring with capsular inflammation and erosions of the distal clavicle, likely overuse phenomena.   Biceps tendinitis and peritendinitis without macro tear.     Patient is here for reevaluation of his right shoulder.  MRI completed for my review.  This is my first time seeing the patient.  Pain is diffuse throughout the rotator cuff insertion.  Patient has attempted injections and physical therapy in the past without relief.  Pain has been present for several months.  Denies any specific injury.      Medical History  Patient's medications, allergies, past medical, surgical, social and family histories were reviewed and updated as appropriate.    Past Medical History:   Diagnosis Date    COPD (chronic obstructive pulmonary disease) (Formerly [facility] [place])     see Dr Akins    GERD (gastroesophageal reflux disease)     before gallbladder removed now it's ok    Hypertension     \"had since 2005- see Dr Cristian Elena\"they did a stress and echo after I came with chest pain 7/2014- not my heart the tests all clear- it was the gallbladder\"    Kidney stone     \"have a kidney stone now- have passed 2 or 3 in the past\"    PONV (postoperative nausea and vomiting)     PONV    Preop pulmonary/respiratory exam 4/16/2021    Short of breath on exertion     Sleep apnea     has sleep study 2014- use a c-pap machine for the past 8 weeks    Vertigo     \"noticed

## 2025-06-30 NOTE — PATIENT INSTRUCTIONS
We will schedule surgery at soonest convenience. If you have any questions regarding your surgery please call our office and ask to speak with Bailee [phone].     If you already spoke with Bailee, please carefully review any instructions she has provided you for your upcoming surgery.

## 2025-06-30 NOTE — TELEPHONE ENCOUNTER
Patient scheduled for    Right Shoulder Arthroscopic Rotator Cuff Repair, Subacromial Decompression, Distal Clavicle Excision, Biceps Tenodesis and Debridement  Date: 8/5/25  Facility: [facility]  Surgeon: Ty Mota DO  Product: Arthrex    Prep for Proc 6/30/25  PCP and cardiac clearances routed via Epic 6/30/25  Pre op appt 6/30/25    [employer]  Contacted 6/30/25 via [employer]  Status: Approved CPT 96426/25849/[id number] ICD M75.111/M75.41/M75.21/M19.011 for outpatient  Auth# [id number]  Date Span: 8/5/25-8/14/25    Phone PAT

## 2025-07-03 ENCOUNTER — TELEPHONE (OUTPATIENT)
Dept: ORTHOPEDIC SURGERY | Age: 50
End: 2025-07-03

## 2025-07-03 PROBLEM — M75.21 BICIPITAL TENDINITIS OF RIGHT SHOULDER: Status: ACTIVE | Noted: 2025-06-30

## 2025-07-03 PROBLEM — M75.41 IMPINGEMENT SYNDROME OF RIGHT SHOULDER: Status: ACTIVE | Noted: 2025-06-30

## 2025-07-03 PROBLEM — M19.011 ARTHRITIS OF RIGHT SHOULDER REGION: Status: ACTIVE | Noted: 2025-06-30

## 2025-07-03 RX ORDER — SODIUM CHLORIDE 0.9 % (FLUSH) 0.9 %
5-40 SYRINGE (ML) INJECTION EVERY 12 HOURS SCHEDULED
Status: CANCELLED | OUTPATIENT
Start: 2025-07-03

## 2025-07-03 RX ORDER — SODIUM CHLORIDE 0.9 % (FLUSH) 0.9 %
5-40 SYRINGE (ML) INJECTION PRN
Status: CANCELLED | OUTPATIENT
Start: 2025-07-03

## 2025-07-03 RX ORDER — ACETAMINOPHEN 325 MG/1
1000 TABLET ORAL ONCE
Status: CANCELLED | OUTPATIENT
Start: 2025-07-03 | End: 2025-07-03

## 2025-07-03 RX ORDER — SODIUM CHLORIDE 9 MG/ML
INJECTION, SOLUTION INTRAVENOUS PRN
Status: CANCELLED | OUTPATIENT
Start: 2025-07-03

## 2025-07-03 RX ORDER — SODIUM CHLORIDE, SODIUM LACTATE, POTASSIUM CHLORIDE, CALCIUM CHLORIDE 600; 310; 30; 20 MG/100ML; MG/100ML; MG/100ML; MG/100ML
INJECTION, SOLUTION INTRAVENOUS CONTINUOUS
Status: CANCELLED | OUTPATIENT
Start: 2025-07-03

## 2025-07-28 ENCOUNTER — HOSPITAL ENCOUNTER (OUTPATIENT)
Dept: GENERAL RADIOLOGY | Age: 50
Discharge: HOME | End: 2025-07-28
Payer: COMMERCIAL

## 2025-07-28 ENCOUNTER — HOSPITAL ENCOUNTER (OUTPATIENT)
Age: 50
Discharge: HOME | End: 2025-07-28
Payer: COMMERCIAL

## 2025-07-28 DIAGNOSIS — Z01.818 PREOP EXAMINATION: ICD-10-CM

## 2025-07-28 LAB
ALBUMIN SERPL-MCNC: 4.5 G/DL (ref 3.4–5)
ALBUMIN/GLOB SERPL: 1.6 {RATIO} (ref 1.1–2.2)
ALP SERPL-CCNC: 85 U/L (ref 40–129)
ALT SERPL-CCNC: 68 U/L (ref 10–40)
ANION GAP SERPL CALCULATED.3IONS-SCNC: 10 MMOL/L (ref 9–17)
AST SERPL-CCNC: 42 U/L (ref 15–37)
BASOPHILS # BLD: 0.06 K/UL
BASOPHILS NFR BLD: 1 % (ref 0–1)
BILIRUB SERPL-MCNC: 0.5 MG/DL (ref 0–1)
BILIRUB UR QL STRIP: NEGATIVE
BUN SERPL-MCNC: 15 MG/DL (ref 7–20)
CALCIUM SERPL-MCNC: 9.6 MG/DL (ref 8.3–10.6)
CHLORIDE SERPL-SCNC: 106 MMOL/L (ref 99–110)
CLARITY UR: CLEAR
CO2 SERPL-SCNC: 23 MMOL/L (ref 21–32)
COLOR UR: YELLOW
COMMENT: NORMAL
CREAT SERPL-MCNC: 1.4 MG/DL (ref 0.9–1.3)
EOSINOPHIL # BLD: 0.16 K/UL
EOSINOPHILS RELATIVE PERCENT: 2 % (ref 0–3)
ERYTHROCYTE [DISTWIDTH] IN BLOOD BY AUTOMATED COUNT: 12.7 % (ref 11.7–14.9)
GFR, ESTIMATED: 54 ML/MIN/1.73M2
GLUCOSE SERPL-MCNC: 112 MG/DL (ref 74–99)
GLUCOSE UR STRIP-MCNC: NEGATIVE MG/DL
HCT VFR BLD AUTO: 49.1 % (ref 42–52)
HGB BLD-MCNC: 16.4 G/DL (ref 13.5–18)
HGB UR QL STRIP.AUTO: NEGATIVE
IMM GRANULOCYTES # BLD AUTO: 0.04 K/UL
IMM GRANULOCYTES NFR BLD: 1 %
KETONES UR STRIP-MCNC: NEGATIVE MG/DL
LEUKOCYTE ESTERASE UR QL STRIP: NEGATIVE
LYMPHOCYTES NFR BLD: 2.47 K/UL
LYMPHOCYTES RELATIVE PERCENT: 34 % (ref 24–44)
MCH RBC QN AUTO: 30.5 PG (ref 27–31)
MCHC RBC AUTO-ENTMCNC: 33.4 G/DL (ref 32–36)
MCV RBC AUTO: 91.3 FL (ref 78–100)
MONOCYTES NFR BLD: 0.58 K/UL
MONOCYTES NFR BLD: 8 % (ref 0–5)
NEUTROPHILS NFR BLD: 54 % (ref 36–66)
NEUTS SEG NFR BLD: 3.92 K/UL
NITRITE UR QL STRIP: NEGATIVE
PH UR STRIP: 5.5 [PH] (ref 5–8)
PLATELET # BLD AUTO: 221 K/UL (ref 140–440)
PMV BLD AUTO: 10.8 FL (ref 7.5–11.1)
POTASSIUM SERPL-SCNC: 4.5 MMOL/L (ref 3.5–5.1)
PROT SERPL-MCNC: 7.2 G/DL (ref 6.4–8.2)
PROT UR STRIP-MCNC: NEGATIVE MG/DL
RBC # BLD AUTO: 5.38 M/UL (ref 4.6–6.2)
SODIUM SERPL-SCNC: 140 MMOL/L (ref 136–145)
SP GR UR STRIP: 1.02 (ref 1–1.03)
UROBILINOGEN UR STRIP-ACNC: 0.2 EU/DL (ref 0–1)
WBC OTHER # BLD: 7.2 K/UL (ref 4–10.5)

## 2025-07-28 PROCEDURE — 80053 COMPREHEN METABOLIC PANEL: CPT

## 2025-07-28 PROCEDURE — 71046 X-RAY EXAM CHEST 2 VIEWS: CPT

## 2025-07-28 PROCEDURE — 85025 COMPLETE CBC W/AUTO DIFF WBC: CPT

## 2025-07-28 PROCEDURE — 81003 URINALYSIS AUTO W/O SCOPE: CPT

## 2025-07-28 PROCEDURE — 87086 URINE CULTURE/COLONY COUNT: CPT

## 2025-07-29 LAB
MICROORGANISM SPEC CULT: NORMAL
SPECIMEN DESCRIPTION: NORMAL

## 2025-08-03 ENCOUNTER — ANESTHESIA EVENT (OUTPATIENT)
Dept: OPERATING ROOM | Age: 50
End: 2025-08-03
Payer: COMMERCIAL

## 2025-08-05 ENCOUNTER — ANESTHESIA (OUTPATIENT)
Dept: OPERATING ROOM | Age: 50
End: 2025-08-05
Payer: COMMERCIAL

## 2025-08-05 ENCOUNTER — HOSPITAL ENCOUNTER (OUTPATIENT)
Age: 50
Setting detail: OUTPATIENT SURGERY
Discharge: HOME | End: 2025-08-05
Attending: STUDENT IN AN ORGANIZED HEALTH CARE EDUCATION/TRAINING PROGRAM | Admitting: STUDENT IN AN ORGANIZED HEALTH CARE EDUCATION/TRAINING PROGRAM
Payer: COMMERCIAL

## 2025-08-05 VITALS
DIASTOLIC BLOOD PRESSURE: 73 MMHG | BODY MASS INDEX: 45.1 KG/M2 | HEART RATE: 87 BPM | TEMPERATURE: 97 F | HEIGHT: 70 IN | RESPIRATION RATE: 18 BRPM | WEIGHT: 315 LBS | SYSTOLIC BLOOD PRESSURE: 120 MMHG | OXYGEN SATURATION: 92 %

## 2025-08-05 DIAGNOSIS — S46.011A TRAUMATIC INCOMPLETE TEAR OF RIGHT ROTATOR CUFF, INITIAL ENCOUNTER: Primary | ICD-10-CM

## 2025-08-05 PROCEDURE — 2500000003 HC RX 250 WO HCPCS

## 2025-08-05 PROCEDURE — 6360000002 HC RX W HCPCS

## 2025-08-05 PROCEDURE — 7100000001 HC PACU RECOVERY - ADDTL 15 MIN: Performed by: STUDENT IN AN ORGANIZED HEALTH CARE EDUCATION/TRAINING PROGRAM

## 2025-08-05 PROCEDURE — 3700000001 HC ADD 15 MINUTES (ANESTHESIA): Performed by: STUDENT IN AN ORGANIZED HEALTH CARE EDUCATION/TRAINING PROGRAM

## 2025-08-05 PROCEDURE — 6370000000 HC RX 637 (ALT 250 FOR IP): Performed by: ANESTHESIOLOGY

## 2025-08-05 PROCEDURE — 3600000005 HC SURGERY LEVEL 5 BASE: Performed by: STUDENT IN AN ORGANIZED HEALTH CARE EDUCATION/TRAINING PROGRAM

## 2025-08-05 PROCEDURE — 7100000000 HC PACU RECOVERY - FIRST 15 MIN: Performed by: STUDENT IN AN ORGANIZED HEALTH CARE EDUCATION/TRAINING PROGRAM

## 2025-08-05 PROCEDURE — 2720000010 HC SURG SUPPLY STERILE: Performed by: STUDENT IN AN ORGANIZED HEALTH CARE EDUCATION/TRAINING PROGRAM

## 2025-08-05 PROCEDURE — 6370000000 HC RX 637 (ALT 250 FOR IP): Performed by: STUDENT IN AN ORGANIZED HEALTH CARE EDUCATION/TRAINING PROGRAM

## 2025-08-05 PROCEDURE — 3700000000 HC ANESTHESIA ATTENDED CARE: Performed by: STUDENT IN AN ORGANIZED HEALTH CARE EDUCATION/TRAINING PROGRAM

## 2025-08-05 PROCEDURE — 2709999900 HC NON-CHARGEABLE SUPPLY: Performed by: STUDENT IN AN ORGANIZED HEALTH CARE EDUCATION/TRAINING PROGRAM

## 2025-08-05 PROCEDURE — 2580000003 HC RX 258: Performed by: STUDENT IN AN ORGANIZED HEALTH CARE EDUCATION/TRAINING PROGRAM

## 2025-08-05 PROCEDURE — 6360000002 HC RX W HCPCS: Performed by: STUDENT IN AN ORGANIZED HEALTH CARE EDUCATION/TRAINING PROGRAM

## 2025-08-05 PROCEDURE — 6360000002 HC RX W HCPCS: Performed by: ANESTHESIOLOGY

## 2025-08-05 PROCEDURE — 7100000010 HC PHASE II RECOVERY - FIRST 15 MIN: Performed by: STUDENT IN AN ORGANIZED HEALTH CARE EDUCATION/TRAINING PROGRAM

## 2025-08-05 PROCEDURE — C1713 ANCHOR/SCREW BN/BN,TIS/BN: HCPCS | Performed by: STUDENT IN AN ORGANIZED HEALTH CARE EDUCATION/TRAINING PROGRAM

## 2025-08-05 PROCEDURE — 3600000015 HC SURGERY LEVEL 5 ADDTL 15MIN: Performed by: STUDENT IN AN ORGANIZED HEALTH CARE EDUCATION/TRAINING PROGRAM

## 2025-08-05 PROCEDURE — L3650 SO 8 ABD RESTRAINT PRE OTS: HCPCS | Performed by: STUDENT IN AN ORGANIZED HEALTH CARE EDUCATION/TRAINING PROGRAM

## 2025-08-05 PROCEDURE — 2580000003 HC RX 258

## 2025-08-05 PROCEDURE — 7100000011 HC PHASE II RECOVERY - ADDTL 15 MIN: Performed by: STUDENT IN AN ORGANIZED HEALTH CARE EDUCATION/TRAINING PROGRAM

## 2025-08-05 PROCEDURE — 64415 NJX AA&/STRD BRCH PLXS IMG: CPT | Performed by: ANESTHESIOLOGY

## 2025-08-05 DEVICE — ANCHOR SUTURE BIOCOMP 4.75X19.1 MM SWIVELOCK C: Type: IMPLANTABLE DEVICE | Site: SHOULDER | Status: FUNCTIONAL

## 2025-08-05 DEVICE — SP 2.6 MM KNOTLESS FIBERTAK ANCHOR: Type: IMPLANTABLE DEVICE | Site: SHOULDER | Status: FUNCTIONAL

## 2025-08-05 DEVICE — ANCHOR SUTURE L 24.5 MM DIA 4.75 MM SUTURE SZ 2 B-TCP/ PEEK: Type: IMPLANTABLE DEVICE | Site: SHOULDER | Status: FUNCTIONAL

## 2025-08-05 RX ORDER — ONDANSETRON 2 MG/ML
4 INJECTION INTRAMUSCULAR; INTRAVENOUS
Status: COMPLETED | OUTPATIENT
Start: 2025-08-05 | End: 2025-08-05

## 2025-08-05 RX ORDER — SODIUM CHLORIDE 0.9 % (FLUSH) 0.9 %
5-40 SYRINGE (ML) INJECTION PRN
Status: DISCONTINUED | OUTPATIENT
Start: 2025-08-05 | End: 2025-08-05 | Stop reason: HOSPADM

## 2025-08-05 RX ORDER — LIDOCAINE HYDROCHLORIDE 20 MG/ML
INJECTION, SOLUTION INTRAVENOUS
Status: DISCONTINUED | OUTPATIENT
Start: 2025-08-05 | End: 2025-08-05 | Stop reason: SDUPTHER

## 2025-08-05 RX ORDER — SODIUM CHLORIDE, SODIUM LACTATE, POTASSIUM CHLORIDE, CALCIUM CHLORIDE 600; 310; 30; 20 MG/100ML; MG/100ML; MG/100ML; MG/100ML
INJECTION, SOLUTION INTRAVENOUS CONTINUOUS
Status: CANCELLED | OUTPATIENT
Start: 2025-08-05

## 2025-08-05 RX ORDER — KETOROLAC TROMETHAMINE 30 MG/ML
15 INJECTION, SOLUTION INTRAMUSCULAR; INTRAVENOUS EVERY 6 HOURS
Status: CANCELLED | OUTPATIENT
Start: 2025-08-05 | End: 2025-08-08

## 2025-08-05 RX ORDER — SCOPOLAMINE 1 MG/3D
1 PATCH, EXTENDED RELEASE TRANSDERMAL ONCE
Status: DISCONTINUED | OUTPATIENT
Start: 2025-08-05 | End: 2025-08-05 | Stop reason: HOSPADM

## 2025-08-05 RX ORDER — SODIUM CHLORIDE 9 MG/ML
INJECTION, SOLUTION INTRAVENOUS PRN
Status: DISCONTINUED | OUTPATIENT
Start: 2025-08-05 | End: 2025-08-05 | Stop reason: HOSPADM

## 2025-08-05 RX ORDER — ACETAMINOPHEN 500 MG
1000 TABLET ORAL ONCE
Status: COMPLETED | OUTPATIENT
Start: 2025-08-05 | End: 2025-08-05

## 2025-08-05 RX ORDER — FENTANYL CITRATE 50 UG/ML
50 INJECTION, SOLUTION INTRAMUSCULAR; INTRAVENOUS EVERY 5 MIN PRN
Status: DISCONTINUED | OUTPATIENT
Start: 2025-08-05 | End: 2025-08-05 | Stop reason: HOSPADM

## 2025-08-05 RX ORDER — ONDANSETRON 2 MG/ML
4 INJECTION INTRAMUSCULAR; INTRAVENOUS EVERY 6 HOURS PRN
Status: CANCELLED | OUTPATIENT
Start: 2025-08-05

## 2025-08-05 RX ORDER — SODIUM CHLORIDE 9 MG/ML
INJECTION, SOLUTION INTRAVENOUS PRN
Status: CANCELLED | OUTPATIENT
Start: 2025-08-05

## 2025-08-05 RX ORDER — OXYCODONE HYDROCHLORIDE 5 MG/1
10 TABLET ORAL EVERY 4 HOURS PRN
Refills: 0 | Status: CANCELLED | OUTPATIENT
Start: 2025-08-05

## 2025-08-05 RX ORDER — SODIUM CHLORIDE 0.9 % (FLUSH) 0.9 %
5-40 SYRINGE (ML) INJECTION PRN
Status: CANCELLED | OUTPATIENT
Start: 2025-08-05

## 2025-08-05 RX ORDER — OXYCODONE HYDROCHLORIDE 5 MG/1
5 TABLET ORAL
Status: DISCONTINUED | OUTPATIENT
Start: 2025-08-05 | End: 2025-08-05 | Stop reason: HOSPADM

## 2025-08-05 RX ORDER — FENTANYL CITRATE 50 UG/ML
INJECTION, SOLUTION INTRAMUSCULAR; INTRAVENOUS
Status: COMPLETED | OUTPATIENT
Start: 2025-08-05 | End: 2025-08-05

## 2025-08-05 RX ORDER — ONDANSETRON 2 MG/ML
INJECTION INTRAMUSCULAR; INTRAVENOUS
Status: DISCONTINUED | OUTPATIENT
Start: 2025-08-05 | End: 2025-08-05 | Stop reason: SDUPTHER

## 2025-08-05 RX ORDER — SODIUM CHLORIDE 0.9 % (FLUSH) 0.9 %
5-40 SYRINGE (ML) INJECTION EVERY 12 HOURS SCHEDULED
Status: DISCONTINUED | OUTPATIENT
Start: 2025-08-05 | End: 2025-08-05 | Stop reason: HOSPADM

## 2025-08-05 RX ORDER — SODIUM CHLORIDE, SODIUM LACTATE, POTASSIUM CHLORIDE, CALCIUM CHLORIDE 600; 310; 30; 20 MG/100ML; MG/100ML; MG/100ML; MG/100ML
INJECTION, SOLUTION INTRAVENOUS
Status: DISCONTINUED | OUTPATIENT
Start: 2025-08-05 | End: 2025-08-05 | Stop reason: SDUPTHER

## 2025-08-05 RX ORDER — OXYCODONE HYDROCHLORIDE 5 MG/1
5 TABLET ORAL EVERY 4 HOURS PRN
Refills: 0 | Status: CANCELLED | OUTPATIENT
Start: 2025-08-05

## 2025-08-05 RX ORDER — LIDOCAINE HYDROCHLORIDE 10 MG/ML
INJECTION, SOLUTION EPIDURAL; INFILTRATION; INTRACAUDAL; PERINEURAL
Status: DISCONTINUED
Start: 2025-08-05 | End: 2025-08-05 | Stop reason: HOSPADM

## 2025-08-05 RX ORDER — SODIUM CHLORIDE 0.9 % (FLUSH) 0.9 %
5-40 SYRINGE (ML) INJECTION EVERY 12 HOURS SCHEDULED
Status: CANCELLED | OUTPATIENT
Start: 2025-08-05

## 2025-08-05 RX ORDER — DEXMEDETOMIDINE HYDROCHLORIDE 100 UG/ML
INJECTION, SOLUTION INTRAVENOUS
Status: DISCONTINUED | OUTPATIENT
Start: 2025-08-05 | End: 2025-08-05 | Stop reason: SDUPTHER

## 2025-08-05 RX ORDER — DROPERIDOL 2.5 MG/ML
0.62 INJECTION, SOLUTION INTRAMUSCULAR; INTRAVENOUS
Status: DISCONTINUED | OUTPATIENT
Start: 2025-08-05 | End: 2025-08-05 | Stop reason: HOSPADM

## 2025-08-05 RX ORDER — EPINEPHRINE 1 MG/ML
INJECTION, SOLUTION, CONCENTRATE INTRAVENOUS
Status: DISCONTINUED | OUTPATIENT
Start: 2025-08-05 | End: 2025-08-05 | Stop reason: ALTCHOICE

## 2025-08-05 RX ORDER — DEXAMETHASONE SODIUM PHOSPHATE 4 MG/ML
INJECTION, SOLUTION INTRA-ARTICULAR; INTRALESIONAL; INTRAMUSCULAR; INTRAVENOUS; SOFT TISSUE
Status: DISCONTINUED | OUTPATIENT
Start: 2025-08-05 | End: 2025-08-05 | Stop reason: SDUPTHER

## 2025-08-05 RX ORDER — ONDANSETRON 4 MG/1
4 TABLET, ORALLY DISINTEGRATING ORAL 3 TIMES DAILY PRN
Qty: 21 TABLET | Refills: 1 | Status: SHIPPED | OUTPATIENT
Start: 2025-08-05

## 2025-08-05 RX ORDER — CYCLOBENZAPRINE HCL 5 MG
5 TABLET ORAL 2 TIMES DAILY PRN
Qty: 30 TABLET | Refills: 2 | Status: SHIPPED | OUTPATIENT
Start: 2025-08-05 | End: 2025-08-15

## 2025-08-05 RX ORDER — MEPERIDINE HYDROCHLORIDE 25 MG/ML
12.5 INJECTION INTRAMUSCULAR; INTRAVENOUS; SUBCUTANEOUS EVERY 5 MIN PRN
Status: DISCONTINUED | OUTPATIENT
Start: 2025-08-05 | End: 2025-08-05 | Stop reason: HOSPADM

## 2025-08-05 RX ORDER — ROCURONIUM BROMIDE 10 MG/ML
INJECTION, SOLUTION INTRAVENOUS
Status: DISCONTINUED | OUTPATIENT
Start: 2025-08-05 | End: 2025-08-05 | Stop reason: SDUPTHER

## 2025-08-05 RX ORDER — ROPIVACAINE HYDROCHLORIDE 5 MG/ML
INJECTION, SOLUTION EPIDURAL; INFILTRATION; PERINEURAL
Status: COMPLETED
Start: 2025-08-05 | End: 2025-08-05

## 2025-08-05 RX ORDER — TRANEXAMIC ACID 10 MG/ML
1000 INJECTION, SOLUTION INTRAVENOUS ONCE
Status: COMPLETED | OUTPATIENT
Start: 2025-08-05 | End: 2025-08-05

## 2025-08-05 RX ORDER — BUPIVACAINE HYDROCHLORIDE 5 MG/ML
INJECTION, SOLUTION EPIDURAL; INTRACAUDAL; PERINEURAL
Status: COMPLETED | OUTPATIENT
Start: 2025-08-05 | End: 2025-08-05

## 2025-08-05 RX ORDER — ONDANSETRON 4 MG/1
4 TABLET, ORALLY DISINTEGRATING ORAL EVERY 8 HOURS PRN
Status: CANCELLED | OUTPATIENT
Start: 2025-08-05

## 2025-08-05 RX ORDER — MIDAZOLAM HYDROCHLORIDE 1 MG/ML
INJECTION, SOLUTION INTRAMUSCULAR; INTRAVENOUS
Status: COMPLETED | OUTPATIENT
Start: 2025-08-05 | End: 2025-08-05

## 2025-08-05 RX ORDER — OXYCODONE AND ACETAMINOPHEN 5; 325 MG/1; MG/1
1 TABLET ORAL EVERY 6 HOURS PRN
Qty: 28 TABLET | Refills: 0 | Status: SHIPPED | OUTPATIENT
Start: 2025-08-05 | End: 2025-08-12

## 2025-08-05 RX ORDER — DEXAMETHASONE SODIUM PHOSPHATE 10 MG/ML
INJECTION, SOLUTION INTRAMUSCULAR; INTRAVENOUS
Status: DISCONTINUED
Start: 2025-08-05 | End: 2025-08-05 | Stop reason: HOSPADM

## 2025-08-05 RX ORDER — PROPOFOL 10 MG/ML
INJECTION, EMULSION INTRAVENOUS
Status: DISCONTINUED | OUTPATIENT
Start: 2025-08-05 | End: 2025-08-05 | Stop reason: SDUPTHER

## 2025-08-05 RX ORDER — MIDAZOLAM HYDROCHLORIDE 1 MG/ML
INJECTION, SOLUTION INTRAMUSCULAR; INTRAVENOUS
Status: DISCONTINUED | OUTPATIENT
Start: 2025-08-05 | End: 2025-08-05

## 2025-08-05 RX ORDER — ROPIVACAINE HYDROCHLORIDE 5 MG/ML
INJECTION, SOLUTION EPIDURAL; INFILTRATION; PERINEURAL
Status: DISCONTINUED | OUTPATIENT
Start: 2025-08-05 | End: 2025-08-05

## 2025-08-05 RX ORDER — SODIUM CHLORIDE, SODIUM LACTATE, POTASSIUM CHLORIDE, CALCIUM CHLORIDE 600; 310; 30; 20 MG/100ML; MG/100ML; MG/100ML; MG/100ML
INJECTION, SOLUTION INTRAVENOUS CONTINUOUS
Status: DISCONTINUED | OUTPATIENT
Start: 2025-08-05 | End: 2025-08-05 | Stop reason: HOSPADM

## 2025-08-05 RX ORDER — FENTANYL CITRATE 50 UG/ML
INJECTION, SOLUTION INTRAMUSCULAR; INTRAVENOUS
Status: DISCONTINUED | OUTPATIENT
Start: 2025-08-05 | End: 2025-08-05

## 2025-08-05 RX ORDER — HYDRALAZINE HYDROCHLORIDE 20 MG/ML
10 INJECTION INTRAMUSCULAR; INTRAVENOUS
Status: DISCONTINUED | OUTPATIENT
Start: 2025-08-05 | End: 2025-08-05 | Stop reason: HOSPADM

## 2025-08-05 RX ORDER — LABETALOL HYDROCHLORIDE 5 MG/ML
10 INJECTION, SOLUTION INTRAVENOUS
Status: DISCONTINUED | OUTPATIENT
Start: 2025-08-05 | End: 2025-08-05 | Stop reason: HOSPADM

## 2025-08-05 RX ORDER — KETAMINE HCL 50MG/ML(1)
SYRINGE (ML) INTRAVENOUS
Status: DISCONTINUED | OUTPATIENT
Start: 2025-08-05 | End: 2025-08-05 | Stop reason: SDUPTHER

## 2025-08-05 RX ADMIN — SUGAMMADEX 400 MG: 100 INJECTION, SOLUTION INTRAVENOUS at 12:03

## 2025-08-05 RX ADMIN — DEXMEDETOMIDINE 8 MCG: 100 INJECTION, SOLUTION INTRAVENOUS at 11:08

## 2025-08-05 RX ADMIN — HYDROMORPHONE HYDROCHLORIDE 0.5 MG: 1 INJECTION, SOLUTION INTRAMUSCULAR; INTRAVENOUS; SUBCUTANEOUS at 11:40

## 2025-08-05 RX ADMIN — LIDOCAINE HYDROCHLORIDE 100 MG: 20 INJECTION, SOLUTION INTRAVENOUS at 09:03

## 2025-08-05 RX ADMIN — ROCURONIUM BROMIDE 25 MG: 10 INJECTION, SOLUTION INTRAVENOUS at 11:08

## 2025-08-05 RX ADMIN — FENTANYL CITRATE 50 MCG: 50 INJECTION INTRAMUSCULAR; INTRAVENOUS at 13:01

## 2025-08-05 RX ADMIN — DEXAMETHASONE SODIUM PHOSPHATE 4 MG: 4 INJECTION, SOLUTION INTRAMUSCULAR; INTRAVENOUS at 09:30

## 2025-08-05 RX ADMIN — FENTANYL CITRATE 100 MCG: 50 INJECTION, SOLUTION INTRAMUSCULAR; INTRAVENOUS at 09:00

## 2025-08-05 RX ADMIN — BUPIVACAINE HYDROCHLORIDE 20 ML: 5 INJECTION, SOLUTION EPIDURAL; INTRACAUDAL; PERINEURAL at 09:00

## 2025-08-05 RX ADMIN — DEXMEDETOMIDINE 8 MCG: 100 INJECTION, SOLUTION INTRAVENOUS at 09:51

## 2025-08-05 RX ADMIN — DEXMEDETOMIDINE 8 MCG: 100 INJECTION, SOLUTION INTRAVENOUS at 10:47

## 2025-08-05 RX ADMIN — PROPOFOL 260 MG: 10 INJECTION, EMULSION INTRAVENOUS at 09:03

## 2025-08-05 RX ADMIN — Medication 50 MG: at 09:47

## 2025-08-05 RX ADMIN — ONDANSETRON 4 MG: 2 INJECTION INTRAMUSCULAR; INTRAVENOUS at 11:41

## 2025-08-05 RX ADMIN — ACETAMINOPHEN 1000 MG: 500 TABLET ORAL at 08:17

## 2025-08-05 RX ADMIN — ROCURONIUM BROMIDE 100 MG: 10 INJECTION, SOLUTION INTRAVENOUS at 09:03

## 2025-08-05 RX ADMIN — DEXMEDETOMIDINE 8 MCG: 100 INJECTION, SOLUTION INTRAVENOUS at 09:54

## 2025-08-05 RX ADMIN — SODIUM CHLORIDE 3000 MG: 9 INJECTION, SOLUTION INTRAVENOUS at 09:23

## 2025-08-05 RX ADMIN — ROCURONIUM BROMIDE 25 MG: 10 INJECTION, SOLUTION INTRAVENOUS at 10:11

## 2025-08-05 RX ADMIN — ONDANSETRON 4 MG: 2 INJECTION INTRAMUSCULAR; INTRAVENOUS at 13:13

## 2025-08-05 RX ADMIN — TRANEXAMIC ACID 1000 MG: 10 INJECTION, SOLUTION INTRAVENOUS at 09:45

## 2025-08-05 RX ADMIN — DEXMEDETOMIDINE 8 MCG: 100 INJECTION, SOLUTION INTRAVENOUS at 10:42

## 2025-08-05 RX ADMIN — SODIUM CHLORIDE, SODIUM LACTATE, POTASSIUM CHLORIDE, AND CALCIUM CHLORIDE: .6; .31; .03; .02 INJECTION, SOLUTION INTRAVENOUS at 08:18

## 2025-08-05 RX ADMIN — SODIUM CHLORIDE, POTASSIUM CHLORIDE, SODIUM LACTATE AND CALCIUM CHLORIDE: 600; 310; 30; 20 INJECTION, SOLUTION INTRAVENOUS at 08:53

## 2025-08-05 RX ADMIN — HYDROMORPHONE HYDROCHLORIDE 0.5 MG: 1 INJECTION, SOLUTION INTRAMUSCULAR; INTRAVENOUS; SUBCUTANEOUS at 12:42

## 2025-08-05 RX ADMIN — ONDANSETRON 4 MG: 2 INJECTION INTRAMUSCULAR; INTRAVENOUS at 09:30

## 2025-08-05 RX ADMIN — SODIUM CHLORIDE, POTASSIUM CHLORIDE, SODIUM LACTATE AND CALCIUM CHLORIDE: 600; 310; 30; 20 INJECTION, SOLUTION INTRAVENOUS at 11:30

## 2025-08-05 RX ADMIN — DEXMEDETOMIDINE 8 MCG: 100 INJECTION, SOLUTION INTRAVENOUS at 10:21

## 2025-08-05 RX ADMIN — MIDAZOLAM 2 MG: 1 INJECTION INTRAMUSCULAR; INTRAVENOUS at 09:00

## 2025-08-05 ASSESSMENT — PAIN DESCRIPTION - DESCRIPTORS
DESCRIPTORS: ACHING;BURNING;DISCOMFORT
DESCRIPTORS: ACHING
DESCRIPTORS: ACHING;BURNING;DISCOMFORT

## 2025-08-05 ASSESSMENT — ENCOUNTER SYMPTOMS
BACK PAIN: 0
SHORTNESS OF BREATH: 0
WHEEZING: 0
COUGH: 0
VOMITING: 0
NAUSEA: 0
SORE THROAT: 0
DIARRHEA: 0
COLOR CHANGE: 0

## 2025-08-05 ASSESSMENT — PAIN - FUNCTIONAL ASSESSMENT
PAIN_FUNCTIONAL_ASSESSMENT: ACTIVITIES ARE NOT PREVENTED
PAIN_FUNCTIONAL_ASSESSMENT: ACTIVITIES ARE NOT PREVENTED
PAIN_FUNCTIONAL_ASSESSMENT: 0-10

## 2025-08-05 ASSESSMENT — PAIN DESCRIPTION - LOCATION
LOCATION: SHOULDER
LOCATION: SHOULDER

## 2025-08-05 ASSESSMENT — PAIN DESCRIPTION - ORIENTATION
ORIENTATION: RIGHT
ORIENTATION: RIGHT

## 2025-08-05 ASSESSMENT — PAIN SCALES - GENERAL: PAINLEVEL_OUTOF10: 6

## 2025-08-20 ENCOUNTER — OFFICE VISIT (OUTPATIENT)
Dept: ORTHOPEDIC SURGERY | Age: 50
End: 2025-08-20

## 2025-08-20 VITALS
WEIGHT: 315 LBS | OXYGEN SATURATION: 98 % | HEART RATE: 88 BPM | HEIGHT: 70 IN | TEMPERATURE: 97.6 F | BODY MASS INDEX: 45.1 KG/M2 | RESPIRATION RATE: 14 BRPM

## 2025-08-20 DIAGNOSIS — Z98.890 STATUS POST RIGHT ROTATOR CUFF REPAIR: Primary | ICD-10-CM

## 2025-08-20 PROCEDURE — 99024 POSTOP FOLLOW-UP VISIT: CPT | Performed by: PHYSICIAN ASSISTANT

## (undated) DEVICE — RESTRAINT POS UNIV HD NK ALIGN DISP FOR SHLDR PROC

## (undated) DEVICE — TOWEL,OR,DSP,ST,BLUE,STD,6/PK,12PK/CS: Brand: MEDLINE

## (undated) DEVICE — PAD ABD W5XL9IN GEN USE NONADHESIVE EXTRA ABSRB SFT

## (undated) DEVICE — PAD THER SHLDR UNIV CLD

## (undated) DEVICE — GUIDEWIRE ENDOSCP L150CM DIA0.035IN TIP 3CM PTFE NIT

## (undated) DEVICE — WAND ABLAT FLO 90 COBLATION

## (undated) DEVICE — BLADE SURG NO11 S STL REUSE HNDL CONVENTIONAL DISP

## (undated) DEVICE — MAT FLOOR ULTRA ABS 28X48IN

## (undated) DEVICE — SPONGE LAP W18XL18IN WHT COT 4 PLY FLD STRUNG RADPQ DISP ST 2 PER PACK

## (undated) DEVICE — LINER SUCT CANSTR 1500CC SEMI RIG W/ POR HYDROPHOBIC SHUT

## (undated) DEVICE — JELLY,LUBE,STERILE,FLIP TOP,TUBE,2-OZ: Brand: MEDLINE

## (undated) DEVICE — SPONGE GZ W4XL4IN 100% COT 16 PLY RADPQ HIGHLY ABSRB

## (undated) DEVICE — SOLUTION IRRIG 500ML 0.9% SOD CHLO USP POUR PLAS BTL

## (undated) DEVICE — GOWN,SIRUS,FABRNF,RAGLAN,L,ST,30/CS: Brand: MEDLINE

## (undated) DEVICE — MAT ABSRB W28XL48IN C6L FLR ULT W POLY BK

## (undated) DEVICE — SOLUTION IRRIG 3000ML 0.9% SOD CHL USP UROMATIC PLAS CONT

## (undated) DEVICE — COUNTER NDL 30 COUNT FOAM STRP SGL MAG

## (undated) DEVICE — Device

## (undated) DEVICE — DRAPE TRNSPAR W51XL47IN PLAS MATTE FINISH U SHP IMPERV

## (undated) DEVICE — Z INACTIVE USE 2635503 SOLUTION IRRIG 3000ML ST H2O USP UROMATIC PLAS CONT

## (undated) DEVICE — BLADE SHV DIA5.5MM BRL BUR 6 FLUT

## (undated) DEVICE — URETERAL ACCESS SHEATH SET: Brand: NAVIGATOR HD

## (undated) DEVICE — CYSTO PACK: Brand: MEDLINE INDUSTRIES, INC.

## (undated) DEVICE — GLOVE SURG SZ 8 L12IN THK75MIL DK GRN LTX FREE

## (undated) DEVICE — TOWEL SURG W17XL27IN BLU COT STD PREWASHED STERILE 6 PER PK

## (undated) DEVICE — NITINOL STONE RETRIEVAL BASKET: Brand: ZERO TIP

## (undated) DEVICE — SYRINGE, LUER LOCK, 10ML: Brand: MEDLINE

## (undated) DEVICE — KIT POS SHLDR STBL LIMB POS SL DRP DISP TENET T-MAX

## (undated) DEVICE — BLADE SHV DIA4MM RED RESECT FOR GEN DEB REM OF PERIOST FR

## (undated) DEVICE — OPEN-END FLEXI-TIP URETERAL CATHETER: Brand: FLEXI-TIP

## (undated) DEVICE — TUBING PMP L16FT MAIN DISP FOR AR-6400 AR-6475 Â€“ ORDER MULTIPLES OF 10 EACH

## (undated) DEVICE — TUBING SCTION CONN 9/32X10 RIB

## (undated) DEVICE — IMMOBILIZER SHLDR SWTH UNIV DEV BLK P.A.D. III

## (undated) DEVICE — SOL IRR SOD CHL 0.9% TITAN XL CNTNR 3000ML

## (undated) DEVICE — Z DISCONTINUED (USE MFG CAT MVABO)  TUBING GAS SAMPLING STD 6.5 FT FEMALE CONN SMRT CAPNOLINE

## (undated) DEVICE — DRESSING PETRO W1XL8IN 3% BISMUTH TRIBROMOPHENATE XRFRM

## (undated) DEVICE — TUBING, SUCTION, 9/32" X 20', STRAIGHT: Brand: MEDLINE INDUSTRIES, INC.

## (undated) DEVICE — TAPE SURG W4XL198IN E FOAM HIGHLY CNFRM MULTIDIRECTION

## (undated) DEVICE — DRAPE EENT SPLIT

## (undated) DEVICE — HANDLE SUCT REG CAP FLANGETIP SMOOTH YANK

## (undated) DEVICE — MARKER,SKIN,WI/RULER AND LABELS: Brand: MEDLINE

## (undated) DEVICE — DRAPE SURG W54XL76IN STD POLYPR IMPERV U DISP

## (undated) DEVICE — NEEDLE SPNL L3.5IN PNK HUB S STL REG WALL FIT STYL W/ QNCKE

## (undated) DEVICE — APPLICATOR MEDICATED 26 CC SOLUTION HI LT ORNG CHLORAPREP

## (undated) DEVICE — FORCEPS BX L240CM JAW DIA2.8MM L CAP W/ NDL MIC MESH TOOTH

## (undated) DEVICE — SOLUTION IV IRRIG WATER 1000ML POUR BRL 2F7114

## (undated) DEVICE — SPONGE GZ W4XL8IN COT WVN 12 PLY

## (undated) DEVICE — GOWN SURGICAL XL-XLONG L52IN BLUE POLY REINFORCED BREATHABLE FILM SLEEVE

## (undated) DEVICE — STERILE LATEX POWDER-FREE SURGICAL GLOVESWITH NITRILE COATING: Brand: PROTEXIS

## (undated) DEVICE — BAG DRNGE W 8MM ADPT AND SUCT HOSE CYSTO UROLOGICAL FOR

## (undated) DEVICE — DRAPE SURG IOBAN W17XL23IN FAB ANTIMIC GEN INCIS LNR FULL W HNDL

## (undated) DEVICE — TRAY PREP DRY W/ PREM GLV 2 APPL 6 SPNG 2 UNDPD 1 OVERWRAP

## (undated) DEVICE — MANIFOLD CART ULT HI FLOW W 3 PRT FOR SUCT

## (undated) DEVICE — CATHETER URET 5FR L70CM TIP 8FR OPN END CONE TIP INJ HUB

## (undated) DEVICE — KIT CHAIR TRIMANO FOAM W/ SUPP ARM DRP ERGONOMICALLY DESIGNED

## (undated) DEVICE — CONTAINER,SPECIMEN,OR STERILE,4OZ: Brand: MEDLINE

## (undated) DEVICE — SET IRRIG L94IN ID0.281IN W/ 4.5IN DST FLX CONN 2 LD ON OFF

## (undated) DEVICE — DRAPE TRNSPAR W30XL35IN CLR PLAS U PCH DEPENDABLE ADH TWO

## (undated) DEVICE — CLASSIC CLD THER SYS

## (undated) DEVICE — GLOVE ORANGE PI 8   MSG9080

## (undated) DEVICE — PROTECTOR EYE PT SELF ADH NS OPT GRD LF

## (undated) DEVICE — DRAPE SURG ST 3/4 SHEET W53XL77IN STD POLYPR 3 QTR DISP

## (undated) DEVICE — SUTURE ETHILON SZ 3-0 L30IN NONABSORBABLE BLK FS-1 L24MM 3/8 669H